# Patient Record
Sex: FEMALE | Race: WHITE | NOT HISPANIC OR LATINO
[De-identification: names, ages, dates, MRNs, and addresses within clinical notes are randomized per-mention and may not be internally consistent; named-entity substitution may affect disease eponyms.]

---

## 2017-04-28 ENCOUNTER — APPOINTMENT (OUTPATIENT)
Dept: ORTHOPEDIC SURGERY | Facility: CLINIC | Age: 67
End: 2017-04-28

## 2017-04-28 VITALS
SYSTOLIC BLOOD PRESSURE: 114 MMHG | WEIGHT: 185 LBS | BODY MASS INDEX: 30.82 KG/M2 | DIASTOLIC BLOOD PRESSURE: 74 MMHG | HEIGHT: 65 IN | HEART RATE: 77 BPM

## 2017-04-28 DIAGNOSIS — Z78.9 OTHER SPECIFIED HEALTH STATUS: ICD-10-CM

## 2017-04-28 DIAGNOSIS — Z60.2 PROBLEMS RELATED TO LIVING ALONE: ICD-10-CM

## 2017-04-28 DIAGNOSIS — M25.552 PAIN IN LEFT HIP: ICD-10-CM

## 2017-04-28 DIAGNOSIS — Z96.653 PRESENCE OF ARTIFICIAL KNEE JOINT, BILATERAL: ICD-10-CM

## 2017-04-28 DIAGNOSIS — M16.11 UNILATERAL PRIMARY OSTEOARTHRITIS, RIGHT HIP: ICD-10-CM

## 2017-04-28 DIAGNOSIS — Z82.61 FAMILY HISTORY OF ARTHRITIS: ICD-10-CM

## 2017-04-28 DIAGNOSIS — Z87.891 PERSONAL HISTORY OF NICOTINE DEPENDENCE: ICD-10-CM

## 2017-04-28 DIAGNOSIS — M19.011 PRIMARY OSTEOARTHRITIS, RIGHT SHOULDER: ICD-10-CM

## 2017-04-28 DIAGNOSIS — Z87.39 PERSONAL HISTORY OF OTHER DISEASES OF THE MUSCULOSKELETAL SYSTEM AND CONNECTIVE TISSUE: ICD-10-CM

## 2017-04-28 DIAGNOSIS — M25.562 PAIN IN LEFT KNEE: ICD-10-CM

## 2017-04-28 SDOH — SOCIAL STABILITY - SOCIAL INSECURITY: PROBLEMS RELATED TO LIVING ALONE: Z60.2

## 2017-05-26 ENCOUNTER — APPOINTMENT (OUTPATIENT)
Dept: ORTHOPEDIC SURGERY | Facility: CLINIC | Age: 67
End: 2017-05-26

## 2017-05-26 DIAGNOSIS — M16.12 UNILATERAL PRIMARY OSTEOARTHRITIS, LEFT HIP: ICD-10-CM

## 2017-05-26 DIAGNOSIS — Z96.652 PRESENCE OF LEFT ARTIFICIAL KNEE JOINT: ICD-10-CM

## 2017-05-26 DIAGNOSIS — Z96.651 PRESENCE OF RIGHT ARTIFICIAL KNEE JOINT: ICD-10-CM

## 2017-07-07 ENCOUNTER — APPOINTMENT (OUTPATIENT)
Dept: ORTHOPEDIC SURGERY | Facility: HOSPITAL | Age: 67
End: 2017-07-07

## 2017-07-25 ENCOUNTER — APPOINTMENT (OUTPATIENT)
Dept: ORTHOPEDIC SURGERY | Facility: CLINIC | Age: 67
End: 2017-07-25

## 2018-07-25 PROBLEM — M16.12 PRIMARY LOCALIZED OSTEOARTHROSIS OF PELVIC REGION, LEFT: Status: ACTIVE | Noted: 2017-04-28

## 2018-07-25 PROBLEM — M16.11 PRIMARY LOCALIZED OSTEOARTHROSIS OF PELVIC REGION, RIGHT: Status: ACTIVE | Noted: 2017-04-28

## 2019-10-02 PROBLEM — Z60.2 PERSON LIVING ALONE: Status: ACTIVE | Noted: 2017-04-28

## 2023-06-21 ENCOUNTER — OUTPATIENT (OUTPATIENT)
Dept: OUTPATIENT SERVICES | Facility: HOSPITAL | Age: 73
LOS: 1 days | End: 2023-06-21
Payer: MEDICARE

## 2023-06-21 ENCOUNTER — APPOINTMENT (OUTPATIENT)
Dept: CT IMAGING | Facility: CLINIC | Age: 73
End: 2023-06-21
Payer: MEDICARE

## 2023-06-21 DIAGNOSIS — C18.9 MALIGNANT NEOPLASM OF COLON, UNSPECIFIED: ICD-10-CM

## 2023-06-21 PROCEDURE — 74177 CT ABD & PELVIS W/CONTRAST: CPT | Mod: 26,MH

## 2023-06-21 PROCEDURE — 74177 CT ABD & PELVIS W/CONTRAST: CPT

## 2023-06-22 ENCOUNTER — APPOINTMENT (OUTPATIENT)
Dept: SURGERY | Facility: CLINIC | Age: 73
End: 2023-06-22
Payer: MEDICARE

## 2023-06-22 VITALS
RESPIRATION RATE: 16 BRPM | SYSTOLIC BLOOD PRESSURE: 127 MMHG | BODY MASS INDEX: 30.86 KG/M2 | DIASTOLIC BLOOD PRESSURE: 80 MMHG | HEART RATE: 83 BPM | HEIGHT: 64.5 IN | OXYGEN SATURATION: 100 % | TEMPERATURE: 96.3 F | WEIGHT: 183 LBS

## 2023-06-22 PROCEDURE — 99204 OFFICE O/P NEW MOD 45 MIN: CPT

## 2023-06-22 NOTE — CONSULT LETTER
[Dear  ___] : Dear ~MIKI, [Courtesy Letter:] : I had the pleasure of seeing your patient, [unfilled], in my office today. [Please see my note below.] : Please see my note below. [Consult Closing:] : Thank you very much for allowing me to participate in the care of this patient.  If you have any questions, please do not hesitate to contact me. [Sincerely,] : Sincerely, [FreeTextEntry2] : Dr. Marcelo Gonzales [FreeTextEntry3] : Phoenix Morales M.D., ROZ.MERISSA., F.[email or web address].[email or web address].\[facility] Chief Colorectal Clinical Services, [facility] [DrRemedios  ___] : Dr. WILLS

## 2023-06-22 NOTE — ASSESSMENT
[FreeTextEntry1] : I have seen and evaluated patient and I have corroborated all nursing input into this note.  Patient with what appears to be an ascending colon cancer.  Pathology is pending.  Results of the CT scans for the extent of disease work-up are also pending.  I reviewed indications, risks, benefits, alternatives for laparoscopic possible open right colectomy including but not limited to bleeding, infection, anastomotic leak, and change in bowel habits.  All questions were answered.  The patient would like to make arrangements to schedule the procedure while the results of her work-up are pending.

## 2023-06-22 NOTE — HISTORY OF PRESENT ILLNESS
[FreeTextEntry1] : Demi is a 72 yr. old female is here for consultation\par \par Colonoscopy by Dr. Gonzales - 06/20/23 - (Rectal bleeding and anemia) - Proximal AC colon mass c/w carcinoma \par 3 cm sigmoid polyp clipped and snared.\par 2.5 cm cecal polyp biopsied\par Several other small polyps noted\par \par Today feeling no pain.  Thought had diarrhea with blood along with BMs Tuesday, June 13th in school, when she got home had another episode, diaphoretic, nauseous, chills.  The patient has been told of the result.  No recent unintentional weight loss.  Formed/soft BMs once daily.   No abdominal surgeries.   Good appetite.  Denies prolapsing tissue or swelling.   No episodes of incontinence of stool or flatus.   Patient is on aspirin occasionally h/o blood clots over 50 years ago.   No family history of colon cancer, or diverticulitis.\par \par \par

## 2023-06-22 NOTE — PHYSICAL EXAM
[Normal Breath Sounds] : Normal breath sounds [Normal Heart Sounds] : normal heart sounds [No Rash or Lesion] : No rash or lesion [Alert] : alert [Oriented to Person] : oriented to person [Oriented to Place] : oriented to place [Oriented to Time] : oriented to time [Calm] : calm [Abdomen Masses] : No abdominal masses [Abdomen Tenderness] : ~T No ~M abdominal tenderness [de-identified] : WNL [de-identified] : WNL [de-identified] : JUANL [de-identified] : WNL ROM [de-identified] : WNL

## 2023-06-23 ENCOUNTER — APPOINTMENT (OUTPATIENT)
Dept: CT IMAGING | Facility: CLINIC | Age: 73
End: 2023-06-23
Payer: MEDICARE

## 2023-06-23 ENCOUNTER — OUTPATIENT (OUTPATIENT)
Dept: OUTPATIENT SERVICES | Facility: HOSPITAL | Age: 73
LOS: 1 days | End: 2023-06-23
Payer: MEDICARE

## 2023-06-23 DIAGNOSIS — C18.9 MALIGNANT NEOPLASM OF COLON, UNSPECIFIED: ICD-10-CM

## 2023-06-23 PROCEDURE — 71260 CT THORAX DX C+: CPT

## 2023-06-23 PROCEDURE — 71260 CT THORAX DX C+: CPT | Mod: 26,MH

## 2023-07-03 ENCOUNTER — RESULT REVIEW (OUTPATIENT)
Age: 73
End: 2023-07-03

## 2023-07-06 DIAGNOSIS — N63.0 UNSPECIFIED LUMP IN UNSPECIFIED BREAST: ICD-10-CM

## 2023-07-17 ENCOUNTER — OUTPATIENT (OUTPATIENT)
Dept: OUTPATIENT SERVICES | Facility: HOSPITAL | Age: 73
LOS: 1 days | End: 2023-07-17
Payer: MEDICARE

## 2023-07-17 VITALS
SYSTOLIC BLOOD PRESSURE: 114 MMHG | OXYGEN SATURATION: 99 % | HEART RATE: 92 BPM | RESPIRATION RATE: 20 BRPM | DIASTOLIC BLOOD PRESSURE: 81 MMHG | WEIGHT: 173.94 LBS | HEIGHT: 64 IN | TEMPERATURE: 99 F

## 2023-07-17 DIAGNOSIS — Z96.643 PRESENCE OF ARTIFICIAL HIP JOINT, BILATERAL: Chronic | ICD-10-CM

## 2023-07-17 DIAGNOSIS — C18.9 MALIGNANT NEOPLASM OF COLON, UNSPECIFIED: ICD-10-CM

## 2023-07-17 DIAGNOSIS — Z01.818 ENCOUNTER FOR OTHER PREPROCEDURAL EXAMINATION: ICD-10-CM

## 2023-07-17 DIAGNOSIS — Z29.9 ENCOUNTER FOR PROPHYLACTIC MEASURES, UNSPECIFIED: ICD-10-CM

## 2023-07-17 DIAGNOSIS — Z96.653 PRESENCE OF ARTIFICIAL KNEE JOINT, BILATERAL: Chronic | ICD-10-CM

## 2023-07-17 LAB
ANION GAP SERPL CALC-SCNC: 12 MMOL/L — SIGNIFICANT CHANGE UP (ref 5–17)
BLD GP AB SCN SERPL QL: NEGATIVE — SIGNIFICANT CHANGE UP
BUN SERPL-MCNC: 20 MG/DL — SIGNIFICANT CHANGE UP (ref 7–23)
CALCIUM SERPL-MCNC: 9.2 MG/DL — SIGNIFICANT CHANGE UP (ref 8.4–10.5)
CEA SERPL-MCNC: 3.8 NG/ML — SIGNIFICANT CHANGE UP (ref 0–3.8)
CHLORIDE SERPL-SCNC: 106 MMOL/L — SIGNIFICANT CHANGE UP (ref 96–108)
CO2 SERPL-SCNC: 23 MMOL/L — SIGNIFICANT CHANGE UP (ref 22–31)
CREAT SERPL-MCNC: 0.56 MG/DL — SIGNIFICANT CHANGE UP (ref 0.5–1.3)
EGFR: 97 ML/MIN/1.73M2 — SIGNIFICANT CHANGE UP
GLUCOSE SERPL-MCNC: 98 MG/DL — SIGNIFICANT CHANGE UP (ref 70–99)
HCT VFR BLD CALC: 30.6 % — LOW (ref 34.5–45)
HGB BLD-MCNC: 9.3 G/DL — LOW (ref 11.5–15.5)
MCHC RBC-ENTMCNC: 25 PG — LOW (ref 27–34)
MCHC RBC-ENTMCNC: 30.4 GM/DL — LOW (ref 32–36)
MCV RBC AUTO: 82.3 FL — SIGNIFICANT CHANGE UP (ref 80–100)
NRBC # BLD: 0 /100 WBCS — SIGNIFICANT CHANGE UP (ref 0–0)
PLATELET # BLD AUTO: 361 K/UL — SIGNIFICANT CHANGE UP (ref 150–400)
POTASSIUM SERPL-MCNC: 4.2 MMOL/L — SIGNIFICANT CHANGE UP (ref 3.5–5.3)
POTASSIUM SERPL-SCNC: 4.2 MMOL/L — SIGNIFICANT CHANGE UP (ref 3.5–5.3)
RBC # BLD: 3.72 M/UL — LOW (ref 3.8–5.2)
RBC # FLD: 20.9 % — HIGH (ref 10.3–14.5)
RH IG SCN BLD-IMP: NEGATIVE — SIGNIFICANT CHANGE UP
SODIUM SERPL-SCNC: 141 MMOL/L — SIGNIFICANT CHANGE UP (ref 135–145)
WBC # BLD: 4.47 K/UL — SIGNIFICANT CHANGE UP (ref 3.8–10.5)
WBC # FLD AUTO: 4.47 K/UL — SIGNIFICANT CHANGE UP (ref 3.8–10.5)

## 2023-07-17 PROCEDURE — 86901 BLOOD TYPING SEROLOGIC RH(D): CPT

## 2023-07-17 PROCEDURE — G0463: CPT

## 2023-07-17 PROCEDURE — 85027 COMPLETE CBC AUTOMATED: CPT

## 2023-07-17 PROCEDURE — 80048 BASIC METABOLIC PNL TOTAL CA: CPT

## 2023-07-17 PROCEDURE — 82378 CARCINOEMBRYONIC ANTIGEN: CPT

## 2023-07-17 PROCEDURE — 86900 BLOOD TYPING SEROLOGIC ABO: CPT

## 2023-07-17 PROCEDURE — 83036 HEMOGLOBIN GLYCOSYLATED A1C: CPT

## 2023-07-17 PROCEDURE — 86850 RBC ANTIBODY SCREEN: CPT

## 2023-07-17 RX ORDER — CIPROFLOXACIN LACTATE 400MG/40ML
400 VIAL (ML) INTRAVENOUS ONCE
Refills: 0 | Status: DISCONTINUED | OUTPATIENT
Start: 2023-07-25 | End: 2023-07-25

## 2023-07-17 RX ORDER — GABAPENTIN 400 MG/1
600 CAPSULE ORAL ONCE
Refills: 0 | Status: COMPLETED | OUTPATIENT
Start: 2023-07-25 | End: 2023-07-25

## 2023-07-17 RX ORDER — LIDOCAINE HCL 20 MG/ML
0.2 VIAL (ML) INJECTION ONCE
Refills: 0 | Status: DISCONTINUED | OUTPATIENT
Start: 2023-07-25 | End: 2023-07-25

## 2023-07-17 RX ORDER — CELECOXIB 200 MG/1
400 CAPSULE ORAL ONCE
Refills: 0 | Status: COMPLETED | OUTPATIENT
Start: 2023-07-25 | End: 2023-07-25

## 2023-07-17 RX ORDER — CHLORHEXIDINE GLUCONATE 213 G/1000ML
1 SOLUTION TOPICAL ONCE
Refills: 0 | Status: DISCONTINUED | OUTPATIENT
Start: 2023-07-25 | End: 2023-07-25

## 2023-07-17 RX ORDER — SODIUM CHLORIDE 9 MG/ML
3 INJECTION INTRAMUSCULAR; INTRAVENOUS; SUBCUTANEOUS EVERY 8 HOURS
Refills: 0 | Status: DISCONTINUED | OUTPATIENT
Start: 2023-07-25 | End: 2023-07-25

## 2023-07-17 NOTE — H&P PST ADULT - NSANTHOSAYNRD_GEN_A_CORE
No. ITZ screening performed.  STOP BANG Legend: 0-2 = LOW Risk; 3-4 = INTERMEDIATE Risk; 5-8 = HIGH Risk

## 2023-07-17 NOTE — H&P PST ADULT - ASSESSMENT
DASI score: 6  DASI activity: mows lawn, gardens, food shopping, housework, no formal exercise  Loose teeth or denture:  denies loose teeth  CAPRINI VTE 2.0 SCORE [CLOT updated 2019]    AGE RELATED RISK FACTORS                                                       MOBILITY RELATED FACTORS  [ ] Age 41-60 years                                            (1 Point)                    [ ] Bed rest                                                        (1 Point)  [ x] Age: 61-74 years                                           (2 Points)                  [ ] Plaster cast                                                   (2 Points)  [ ] Age= 75 years                                              (3 Points)                    [ ] Bed bound for more than 72 hours                 (2 Points)    DISEASE RELATED RISK FACTORS                                               GENDER SPECIFIC FACTORS  [ ] Edema in the lower extremities                       (1 Point)              [ ] Pregnancy                                                     (1 Point)  [ ] Varicose veins                                               (1 Point)                     [ ] Post-partum < 6 weeks                                   (1 Point)             [x ] BMI > 25 Kg/m2                                            (1 Point)                     [ ] Hormonal therapy  or oral contraception          (1 Point)                 [ ] Sepsis (in the previous month)                        (1 Point)               [ ] History of pregnancy complications                 (1 point)  [ ] Pneumonia or serious lung disease                                               [ ] Unexplained or recurrent                     (1 Point)           (in the previous month)                               (1 Point)  [ ] Abnormal pulmonary function test                     (1 Point)                 SURGERY RELATED RISK FACTORS  [ ] Acute myocardial infarction                              (1 Point)               [ ]  Section                                             (1 Point)  [ ] Congestive heart failure (in the previous month)  (1 Point)      [ ] Minor surgery                                                  (1 Point)   [ ] Inflammatory bowel disease                             (1 Point)               [ ] Arthroscopic surgery                                        (2 Points)  [ ] Central venous access                                      (2 Points)                [ x] General surgery lasting more than 45 minutes (2 points)  [x ] Malignancy- Present or previous                   (2 Points)                [ ] Elective arthroplasty                                         (5 points)    [ ] Stroke (in the previous month)                          (5 Points)                                                                                                                                                           HEMATOLOGY RELATED FACTORS                                                 TRAUMA RELATED RISK FACTORS  [x ] Prior episodes of VTE                                     (3 Points)                [ ] Fracture of the hip, pelvis, or leg                       (5 Points)  [ ] Positive family history for VTE                         (3 Points)             [ ] Acute spinal cord injury (in the previous month)  (5 Points)  [ ] Prothrombin 88570 A                                     (3 Points)               [ ] Paralysis  (less than 1 month)                             (5 Points)  [ ] Factor V Leiden                                             (3 Points)                  [ ] Multiple Trauma within 1 month                        (5 Points)  [ ] Lupus anticoagulants                                     (3 Points)                                                           [ ] Anticardiolipin antibodies                               (3 Points)                                                       [ ] High homocysteine in the blood                      (3 Points)                                             [ ] Other congenital or acquired thrombophilia      (3 Points)                                                [ ] Heparin induced thrombocytopenia                  (3 Points)                                     Total Score [     10     ]

## 2023-07-17 NOTE — H&P PST ADULT - HISTORY OF PRESENT ILLNESS
73 YO FEMALE with episode on 6/13/23 with severe bloody diarrhea and stomach grumbling x2 over two hour span.  colonoscopy revealed colon mass, now for colon resection 7/25/23.   PMHX of PE age 29, no recurrence, Osteoarthritis (GELY hips and Knees done),sensitive skin. 73 YO FEMALE with episode on 6/13/23 with severe bloody diarrhea and stomach grumbling x2 over two hour span.  colonoscopy revealed colon mass, now for colon resection 7/25/23.   PMHX of PE age 29, no recurrence, Osteoarthritis (GELY hips and Knees done),sensitive skin.  Pt states low blood count 6/21/23 was told she may need preop blood transfusion, CBC rechecked today at PST.

## 2023-07-17 NOTE — H&P PST ADULT - NSICDXPASTSURGICALHX_GEN_ALL_CORE_FT
PAST SURGICAL HISTORY:  S/P hip replacement, bilateral     S/p total knee replacement, bilateral

## 2023-07-18 LAB
A1C WITH ESTIMATED AVERAGE GLUCOSE RESULT: 5.3 % — SIGNIFICANT CHANGE UP (ref 4–5.6)
ESTIMATED AVERAGE GLUCOSE: 105 MG/DL — SIGNIFICANT CHANGE UP (ref 68–114)

## 2023-07-24 ENCOUNTER — TRANSCRIPTION ENCOUNTER (OUTPATIENT)
Age: 73
End: 2023-07-24

## 2023-07-25 ENCOUNTER — APPOINTMENT (OUTPATIENT)
Dept: SURGERY | Facility: HOSPITAL | Age: 73
End: 2023-07-25
Payer: MEDICARE

## 2023-07-25 ENCOUNTER — INPATIENT (INPATIENT)
Facility: HOSPITAL | Age: 73
LOS: 5 days | Discharge: HOME | DRG: 330 | End: 2023-07-31
Payer: MEDICARE

## 2023-07-25 ENCOUNTER — RESULT REVIEW (OUTPATIENT)
Age: 73
End: 2023-07-25

## 2023-07-25 VITALS
HEART RATE: 96 BPM | WEIGHT: 173.94 LBS | RESPIRATION RATE: 18 BRPM | TEMPERATURE: 98 F | SYSTOLIC BLOOD PRESSURE: 105 MMHG | OXYGEN SATURATION: 97 % | HEIGHT: 64 IN | DIASTOLIC BLOOD PRESSURE: 72 MMHG

## 2023-07-25 DIAGNOSIS — Z96.653 PRESENCE OF ARTIFICIAL KNEE JOINT, BILATERAL: Chronic | ICD-10-CM

## 2023-07-25 DIAGNOSIS — Z96.643 PRESENCE OF ARTIFICIAL HIP JOINT, BILATERAL: Chronic | ICD-10-CM

## 2023-07-25 DIAGNOSIS — C18.9 MALIGNANT NEOPLASM OF COLON, UNSPECIFIED: ICD-10-CM

## 2023-07-25 LAB — GLUCOSE BLDC GLUCOMTR-MCNC: 234 MG/DL — HIGH (ref 70–99)

## 2023-07-25 PROCEDURE — 88302 TISSUE EXAM BY PATHOLOGIST: CPT | Mod: 26

## 2023-07-25 PROCEDURE — 88342 IMHCHEM/IMCYTCHM 1ST ANTB: CPT | Mod: 26

## 2023-07-25 PROCEDURE — 49592 RPR AA HRN 1ST < 3 NCR/STRN: CPT

## 2023-07-25 PROCEDURE — 88309 TISSUE EXAM BY PATHOLOGIST: CPT | Mod: 26

## 2023-07-25 PROCEDURE — 44204 LAPARO PARTIAL COLECTOMY: CPT

## 2023-07-25 PROCEDURE — 88341 IMHCHEM/IMCYTCHM EA ADD ANTB: CPT | Mod: 26

## 2023-07-25 DEVICE — STAPLER COVIDIEN GIA 80-4.0MM BLACK: Type: IMPLANTABLE DEVICE | Status: FUNCTIONAL

## 2023-07-25 DEVICE — STAPLER COVIDIEN GIA 80-3.0MM PURPLE RELOAD: Type: IMPLANTABLE DEVICE | Status: FUNCTIONAL

## 2023-07-25 RX ORDER — SODIUM CHLORIDE 9 MG/ML
1000 INJECTION, SOLUTION INTRAVENOUS
Refills: 0 | Status: DISCONTINUED | OUTPATIENT
Start: 2023-07-25 | End: 2023-07-26

## 2023-07-25 RX ORDER — HYDROMORPHONE HYDROCHLORIDE 2 MG/ML
0.25 INJECTION INTRAMUSCULAR; INTRAVENOUS; SUBCUTANEOUS
Refills: 0 | Status: DISCONTINUED | OUTPATIENT
Start: 2023-07-25 | End: 2023-07-25

## 2023-07-25 RX ORDER — ACETAMINOPHEN 500 MG
1000 TABLET ORAL EVERY 6 HOURS
Refills: 0 | Status: COMPLETED | OUTPATIENT
Start: 2023-07-25 | End: 2023-07-26

## 2023-07-25 RX ORDER — OXYCODONE HYDROCHLORIDE 5 MG/1
5 TABLET ORAL EVERY 6 HOURS
Refills: 0 | Status: DISCONTINUED | OUTPATIENT
Start: 2023-07-25 | End: 2023-07-27

## 2023-07-25 RX ORDER — SODIUM CHLORIDE 9 MG/ML
1000 INJECTION, SOLUTION INTRAVENOUS
Refills: 0 | Status: DISCONTINUED | OUTPATIENT
Start: 2023-07-25 | End: 2023-07-25

## 2023-07-25 RX ORDER — OXYCODONE HYDROCHLORIDE 5 MG/1
2.5 TABLET ORAL EVERY 6 HOURS
Refills: 0 | Status: DISCONTINUED | OUTPATIENT
Start: 2023-07-25 | End: 2023-07-27

## 2023-07-25 RX ADMIN — CELECOXIB 400 MILLIGRAM(S): 200 CAPSULE ORAL at 17:20

## 2023-07-25 RX ADMIN — GABAPENTIN 600 MILLIGRAM(S): 400 CAPSULE ORAL at 17:21

## 2023-07-25 RX ADMIN — SODIUM CHLORIDE 40 MILLILITER(S): 9 INJECTION, SOLUTION INTRAVENOUS at 23:35

## 2023-07-25 NOTE — BRIEF OPERATIVE NOTE - OPERATION/FINDINGS
Hand assisted laparoscopic right hemicolectomy with extracorporeal anastamosis, stapled. Ureter identified and protected. Pre op Duramorph. Intra op TAP block.

## 2023-07-26 ENCOUNTER — TRANSCRIPTION ENCOUNTER (OUTPATIENT)
Age: 73
End: 2023-07-26

## 2023-07-26 LAB
ANION GAP SERPL CALC-SCNC: 11 MMOL/L — SIGNIFICANT CHANGE UP (ref 5–17)
BUN SERPL-MCNC: 10 MG/DL — SIGNIFICANT CHANGE UP (ref 7–23)
CALCIUM SERPL-MCNC: 8.6 MG/DL — SIGNIFICANT CHANGE UP (ref 8.4–10.5)
CHLORIDE SERPL-SCNC: 105 MMOL/L — SIGNIFICANT CHANGE UP (ref 96–108)
CO2 SERPL-SCNC: 23 MMOL/L — SIGNIFICANT CHANGE UP (ref 22–31)
CREAT SERPL-MCNC: 0.57 MG/DL — SIGNIFICANT CHANGE UP (ref 0.5–1.3)
EGFR: 96 ML/MIN/1.73M2 — SIGNIFICANT CHANGE UP
GLUCOSE SERPL-MCNC: 171 MG/DL — HIGH (ref 70–99)
HCT VFR BLD CALC: 32.8 % — LOW (ref 34.5–45)
HGB BLD-MCNC: 9.7 G/DL — LOW (ref 11.5–15.5)
MAGNESIUM SERPL-MCNC: 2.3 MG/DL — SIGNIFICANT CHANGE UP (ref 1.6–2.6)
MCHC RBC-ENTMCNC: 24.6 PG — LOW (ref 27–34)
MCHC RBC-ENTMCNC: 29.6 GM/DL — LOW (ref 32–36)
MCV RBC AUTO: 83 FL — SIGNIFICANT CHANGE UP (ref 80–100)
NRBC # BLD: 0 /100 WBCS — SIGNIFICANT CHANGE UP (ref 0–0)
PHOSPHATE SERPL-MCNC: 4.1 MG/DL — SIGNIFICANT CHANGE UP (ref 2.5–4.5)
PLATELET # BLD AUTO: 269 K/UL — SIGNIFICANT CHANGE UP (ref 150–400)
POTASSIUM SERPL-MCNC: 4.2 MMOL/L — SIGNIFICANT CHANGE UP (ref 3.5–5.3)
POTASSIUM SERPL-SCNC: 4.2 MMOL/L — SIGNIFICANT CHANGE UP (ref 3.5–5.3)
RBC # BLD: 3.95 M/UL — SIGNIFICANT CHANGE UP (ref 3.8–5.2)
RBC # FLD: 20 % — HIGH (ref 10.3–14.5)
SODIUM SERPL-SCNC: 139 MMOL/L — SIGNIFICANT CHANGE UP (ref 135–145)
WBC # BLD: 7.53 K/UL — SIGNIFICANT CHANGE UP (ref 3.8–10.5)
WBC # FLD AUTO: 7.53 K/UL — SIGNIFICANT CHANGE UP (ref 3.8–10.5)

## 2023-07-26 RX ORDER — NALOXONE HYDROCHLORIDE 4 MG/.1ML
0.1 SPRAY NASAL
Refills: 0 | Status: DISCONTINUED | OUTPATIENT
Start: 2023-07-26 | End: 2023-07-30

## 2023-07-26 RX ORDER — MORPHINE SULFATE 50 MG/1
0.1 CAPSULE, EXTENDED RELEASE ORAL ONCE
Refills: 0 | Status: DISCONTINUED | OUTPATIENT
Start: 2023-07-26 | End: 2023-07-26

## 2023-07-26 RX ORDER — NALBUPHINE HYDROCHLORIDE 10 MG/ML
2.5 INJECTION, SOLUTION INTRAMUSCULAR; INTRAVENOUS; SUBCUTANEOUS EVERY 6 HOURS
Refills: 0 | Status: DISCONTINUED | OUTPATIENT
Start: 2023-07-26 | End: 2023-07-30

## 2023-07-26 RX ORDER — MAGNESIUM OXIDE 400 MG ORAL TABLET 241.3 MG
1000 TABLET ORAL
Refills: 0 | Status: DISCONTINUED | OUTPATIENT
Start: 2023-07-26 | End: 2023-07-27

## 2023-07-26 RX ORDER — ONDANSETRON 8 MG/1
4 TABLET, FILM COATED ORAL EVERY 6 HOURS
Refills: 0 | Status: DISCONTINUED | OUTPATIENT
Start: 2023-07-26 | End: 2023-07-30

## 2023-07-26 RX ORDER — HEPARIN SODIUM 5000 [USP'U]/ML
5000 INJECTION INTRAVENOUS; SUBCUTANEOUS EVERY 8 HOURS
Refills: 0 | Status: DISCONTINUED | OUTPATIENT
Start: 2023-07-26 | End: 2023-07-31

## 2023-07-26 RX ORDER — ACETAMINOPHEN 500 MG
1000 TABLET ORAL EVERY 6 HOURS
Refills: 0 | Status: DISCONTINUED | OUTPATIENT
Start: 2023-07-27 | End: 2023-07-27

## 2023-07-26 RX ADMIN — HEPARIN SODIUM 5000 UNIT(S): 5000 INJECTION INTRAVENOUS; SUBCUTANEOUS at 22:36

## 2023-07-26 RX ADMIN — Medication 400 MILLIGRAM(S): at 16:30

## 2023-07-26 RX ADMIN — SODIUM CHLORIDE 40 MILLILITER(S): 9 INJECTION, SOLUTION INTRAVENOUS at 05:46

## 2023-07-26 RX ADMIN — Medication 400 MILLIGRAM(S): at 10:55

## 2023-07-26 RX ADMIN — Medication 1000 MILLIGRAM(S): at 06:15

## 2023-07-26 RX ADMIN — Medication 1000 MILLIGRAM(S): at 23:05

## 2023-07-26 RX ADMIN — Medication 400 MILLIGRAM(S): at 22:35

## 2023-07-26 RX ADMIN — MAGNESIUM OXIDE 400 MG ORAL TABLET 1000 MILLIGRAM(S): 241.3 TABLET ORAL at 12:28

## 2023-07-26 RX ADMIN — HEPARIN SODIUM 5000 UNIT(S): 5000 INJECTION INTRAVENOUS; SUBCUTANEOUS at 12:30

## 2023-07-26 RX ADMIN — Medication 1000 MILLIGRAM(S): at 17:00

## 2023-07-26 RX ADMIN — Medication 400 MILLIGRAM(S): at 05:45

## 2023-07-26 NOTE — DISCHARGE NOTE PROVIDER - HOSPITAL COURSE
HPI:  71 YO FEMALE with episode on 6/13/23 with severe bloody diarrhea and stomach grumbling x2 over two hour span.  colonoscopy revealed colon mass, now for colon resection 7/25/23.   PMHX of PE age 29, no recurrence, Osteoarthritis (GELY hips and Knees done),sensitive skin.  Pt states low blood count 6/21/23 was told she may need preop blood transfusion, CBC rechecked today at [facility]. (17 Jul 2023 15:54)    Patient presented for scheduled procedure. Underwent lap R colectomy without complication and was transferred to the PACU. When PACU criteria was met, patient was transferred to the floor for observation and continuation of ERP protocol. Postoperatively, pain well controlled. Singh removed POD1 with subsequent successful trial of void. PT evaluated patient and recommended ___________. Dietician consulted for education. Diet advanced as tolerated to low fiber diet. DVT ppx with SCDs and SQH administered throughout hospital stay. Ambulation and incentive spirometry encouraged. Home meds resumed. Labs trended and lytes repleted prn. Local wound care to incision sites daily. At time of discharge, patient was hemodynamically stable, tolerating LFD and pain was adequately controlled.    HPI:  73 YO FEMALE with episode on 6/13/23 with severe bloody diarrhea and stomach grumbling x2 over two hour span.  colonoscopy revealed colon mass, now for colon resection 7/25/23.   PMHX of PE age 29, no recurrence, Osteoarthritis (GELY hips and Knees done),sensitive skin.  Pt states low blood count 6/21/23 was told she may need preop blood transfusion, CBC rechecked today at [facility]. (17 Jul 2023 15:54)    Patient presented for scheduled procedure. Underwent lap R colectomy without complication and was transferred to the PACU. When PACU criteria was met, patient was transferred to the floor for observation and continuation of ERP protocol. Postoperatively, pain well controlled. Singh removed POD1 with subsequent successful trial of void. PT evaluated patient and recommended home with no skilled PT needs. Dietician consulted for education. Diet advanced as tolerated to low fiber diet. On 7/27 the patient had nausea and a nasogastric tube was placed for ileus. Patient had return of bowel function and nasogastric tube was removed. Diet was advanced as tolerated. DVT ppx with SCDs and SQH administered throughout hospital stay. Ambulation and incentive spirometry encouraged. Home meds resumed. Labs trended and lytes repleted prn. Local wound care to incision sites daily. At time of discharge, patient was hemodynamically stable, tolerating LFD and pain was adequately controlled.     The patients caprini score was calculated to be 10 and the patient was sent DVT ppx.

## 2023-07-26 NOTE — DISCHARGE NOTE PROVIDER - NSDCCPCAREPLAN_GEN_ALL_CORE_FT
PRINCIPAL DISCHARGE DIAGNOSIS  Diagnosis: Malignant neoplasm of colon  Assessment and Plan of Treatment: WOUND CARE: Staples will be removed at follow up office visit. Remove dry dressings prior to showering. Replace dry gauze dressing as needed.   BATHING: Please do not submerge wound underwater. No swimming pools, no hot tubs, no tub baths. You may shower and/or sponge bathe in 24 hours. Let soapy water run over incisions. DO NOT scrub or soak incision sites. Pat dry.   ACTIVITY: No heavy lifting more than 10-15lbs or straining. Otherwise, you may return to your usual level of physical activity. You may complete your daily activities. Take frequent walks. If you are taking narcotic pain medication (such as Oxycodone), do NOT drive a car, operate machinery or make important decisions.  DIET: Low fiber diet as tolerated   NOTIFY YOUR SURGEON IF: You have any bleeding that does not stop, any pus draining from your wound, any fever (over 100.4 F) or chills, persistent nausea/vomiting with inability to tolerate food or liquids, persistent diarrhea, or if your pain is not controlled on your discharge pain medications.  FOLLOW-UP:  1. Please call to make a follow-up appointment with Dr. Morales within one week of discharge   2. Please follow up with your primary care physician in one week regarding your hospitalization.      SECONDARY DISCHARGE DIAGNOSES  Diagnosis: Need for prophylactic measure  Assessment and Plan of Treatment:      PRINCIPAL DISCHARGE DIAGNOSIS  Diagnosis: Malignant neoplasm of colon  Assessment and Plan of Treatment: WOUND CARE: Staples will be removed at follow up office visit. Remove dry dressings prior to showering. Replace dry gauze dressing as needed.   BATHING: Please do not submerge wound underwater. No swimming pools, no hot tubs, no tub baths. You may shower and/or sponge bathe in 24 hours. Let soapy water run over incisions. DO NOT scrub or soak incision sites. Pat dry.   ACTIVITY: No heavy lifting more than 10-15lbs or straining. Otherwise, you may return to your usual level of physical activity. You may complete your daily activities. Take frequent walks. If you are taking narcotic pain medication (such as Oxycodone), do NOT drive a car, operate machinery or make important decisions.  DIET: Low fiber diet as tolerated   NOTIFY YOUR SURGEON IF: You have any bleeding that does not stop, any pus draining from your wound, any fever (over 100.4 F) or chills, persistent nausea/vomiting with inability to tolerate food or liquids, persistent diarrhea, or if your pain is not controlled on your discharge pain medications.  FOLLOW-UP:  1. Please call to make a follow-up appointment with Dr. Morales within one week of discharge   2. Please follow up with your primary care physician in one week regarding your hospitalization.      SECONDARY DISCHARGE DIAGNOSES  Diagnosis: Need for prophylactic measure  Assessment and Plan of Treatment: Based on your calculated Caprini Score, it was determined  you are at a higher risk to developing a deep vein thrombosis (DVT) or a pulmonary embolus (PE) post operatively.  You are encouraged to ambulate regulary at home and start the anticoagulation medication Eliquis.   Please take as prescribed. The prescription has been sent to your pharmacy.   Please call your Primary Care physician or Surgeon and return to ER  if you have any swelling and/or pain of your extremities, feel short of breath or having any difficulty breathing.

## 2023-07-26 NOTE — PHYSICAL THERAPY INITIAL EVALUATION ADULT - PERTINENT HX OF CURRENT PROBLEM, REHAB EVAL
71 YO FEMALE with episode on 6/13/23 with severe bloody diarrhea and stomach grumbling x2 over two hour span.  colonoscopy revealed colon mass, now for colon resection 7/25/23.   PMHX of PE age 29, no recurrence, Osteoarthritis (GELY hips and Knees done),sensitive skin. image pending

## 2023-07-26 NOTE — PROGRESS NOTE ADULT - SUBJECTIVE AND OBJECTIVE BOX
[facility] Daily Resident Progress Note    OVERNIGHT: Flatus/BM, UOP, NAEON.     SUBJECTIVE: Pt seen and examined at bedside. Pain well-controlled, denies nausea/vomiting.     OBJECTIVE:  Vital Signs Last 24 Hrs  T(C): 36.4 (26 Jul 2023 04:33), Max: 36.5 (25 Jul 2023 12:47)  T(F): 97.5 (26 Jul 2023 04:33), Max: 97.7 (25 Jul 2023 12:47)  HR: 78 (26 Jul 2023 04:33) (78 - 96)  BP: 94/58 (26 Jul 2023 04:33) (94/58 - 106/54)  BP(mean): 71 (26 Jul 2023 04:33) (70 - 78)  RR: 17 (26 Jul 2023 04:33) (15 - 18)  SpO2: 97% (26 Jul 2023 04:33) (95% - 99%)    Parameters below as of 26 Jul 2023 04:33  Patient On (Oxygen Delivery Method): nasal cannula  O2 Flow (L/min): 2    Physical Exam:  General Appearance: Comfortable appearing, alert, no gross cognitive alteration.   Chest: Equal expansion bilaterally, no increased WOB.   CV: WWP.    Abdomen: Soft, non-tender, appropriately distended.   Extremities: No swelling, asymmetry, or gross deformity appreciated.     I&O's Summary    25 Jul 2023 07:01  -  26 Jul 2023 05:15  --------------------------------------------------------  IN: 400 mL / OUT: 430 mL / NET: -30 mL      lactated ringers. milliLiter(s) (40 mL/Hr)      Medications:  MEDICATIONS  (STANDING):  acetaminophen   IVPB .. 1000 milliGRAM(s) IV Intermittent every 6 hours  lactated ringers. 1000 milliLiter(s) (40 mL/Hr) IV Continuous <Continuous>  morphine PF Spinal 0.1 milliGRAM(s) IntraThecal. once    MEDICATIONS  (PRN):  nalbuphine Injectable 2.5 milliGRAM(s) IV Push every 6 hours PRN Pruritus  naloxone Injectable 0.1 milliGRAM(s) IV Push every 3 minutes PRN For ANY of the following changes in patient status:  A. RR LESS THAN 10 breaths per minute, B. Oxygen saturation LESS THAN 90%, C. Sedation score of 6  ondansetron Injectable 4 milliGRAM(s) IV Push every 6 hours PRN Nausea  oxyCODONE    IR 2.5 milliGRAM(s) Oral every 6 hours PRN Moderate Pain (4 - 6)  oxyCODONE    IR 5 milliGRAM(s) Oral every 6 hours PRN Severe Pain (7 - 10)    Allergies:  penicillin (Unknown)      Labs:           [place] [facility] Daily Resident Progress Note    OVERNIGHT:     SUBJECTIVE: Pt seen and examined at bedside.     OBJECTIVE:  Vital Signs Last 24 Hrs  T(C): 36.4 (26 Jul 2023 04:33), Max: 36.5 (25 Jul 2023 12:47)  T(F): 97.5 (26 Jul 2023 04:33), Max: 97.7 (25 Jul 2023 12:47)  HR: 78 (26 Jul 2023 04:33) (78 - 96)  BP: 94/58 (26 Jul 2023 04:33) (94/58 - 106/54)  BP(mean): 71 (26 Jul 2023 04:33) (70 - 78)  RR: 17 (26 Jul 2023 04:33) (15 - 18)  SpO2: 97% (26 Jul 2023 04:33) (95% - 99%)    Parameters below as of 26 Jul 2023 04:33  Patient On (Oxygen Delivery Method): nasal cannula  O2 Flow (L/min): 2    Physical Exam:  General Appearance:   Chest:    CV:  Abdomen:    Extremities:     I&O's Summary    25 Jul 2023 07:01  -  26 Jul 2023 05:15  --------------------------------------------------------  IN: 400 mL / OUT: 430 mL / NET: -30 mL    Medications:  MEDICATIONS  (STANDING):  acetaminophen   IVPB .. 1000 milliGRAM(s) IV Intermittent every 6 hours  lactated ringers. 1000 milliLiter(s) (40 mL/Hr) IV Continuous <Continuous>  morphine PF Spinal 0.1 milliGRAM(s) IntraThecal. once    MEDICATIONS  (PRN):  nalbuphine Injectable 2.5 milliGRAM(s) IV Push every 6 hours PRN Pruritus  naloxone Injectable 0.1 milliGRAM(s) IV Push every 3 minutes PRN For ANY of the following changes in patient status:  A. RR LESS THAN 10 breaths per minute, B. Oxygen saturation LESS THAN 90%, C. Sedation score of 6  ondansetron Injectable 4 milliGRAM(s) IV Push every 6 hours PRN Nausea  oxyCODONE    IR 2.5 milliGRAM(s) Oral every 6 hours PRN Moderate Pain (4 - 6)  oxyCODONE    IR 5 milliGRAM(s) Oral every 6 hours PRN Severe Pain (7 - 10)    Allergies:  penicillin (Unknown)       [place] [facility] Daily Resident Progress Note    OVERNIGHT: Recovering appropriately on POC @ 0315. No CP, SOB, clinically significant hypotension ON.     SUBJECTIVE: Pt seen and examined at bedside.     OBJECTIVE:  Vital Signs Last 24 Hrs  T(C): 36.4 (26 Jul 2023 04:33), Max: 36.5 (25 Jul 2023 12:47)  T(F): 97.5 (26 Jul 2023 04:33), Max: 97.7 (25 Jul 2023 12:47)  HR: 78 (26 Jul 2023 04:33) (78 - 96)  BP: 94/58 (26 Jul 2023 04:33) (94/58 - 106/54)  BP(mean): 71 (26 Jul 2023 04:33) (70 - 78)  RR: 17 (26 Jul 2023 04:33) (15 - 18)  SpO2: 97% (26 Jul 2023 04:33) (95% - 99%)    Parameters below as of 26 Jul 2023 04:33  Patient On (Oxygen Delivery Method): nasal cannula  O2 Flow (L/min): 2    Physical Exam:  General Appearance:   Chest:    CV:  Abdomen:    Extremities:     I&O's Summary    25 Jul 2023 07:01  -  26 Jul 2023 05:15  --------------------------------------------------------  IN: 400 mL / OUT: 430 mL / NET: -30 mL    Medications:  MEDICATIONS  (STANDING):  acetaminophen   IVPB .. 1000 milliGRAM(s) IV Intermittent every 6 hours  lactated ringers. 1000 milliLiter(s) (40 mL/Hr) IV Continuous <Continuous>  morphine PF Spinal 0.1 milliGRAM(s) IntraThecal. once    MEDICATIONS  (PRN):  nalbuphine Injectable 2.5 milliGRAM(s) IV Push every 6 hours PRN Pruritus  naloxone Injectable 0.1 milliGRAM(s) IV Push every 3 minutes PRN For ANY of the following changes in patient status:  A. RR LESS THAN 10 breaths per minute, B. Oxygen saturation LESS THAN 90%, C. Sedation score of 6  ondansetron Injectable 4 milliGRAM(s) IV Push every 6 hours PRN Nausea  oxyCODONE    IR 2.5 milliGRAM(s) Oral every 6 hours PRN Moderate Pain (4 - 6)  oxyCODONE    IR 5 milliGRAM(s) Oral every 6 hours PRN Severe Pain (7 - 10)    Allergies:  penicillin (Unknown)       [place] [facility] Daily Resident Progress Note    OVERNIGHT: Recovering appropriately on POC @ 0315. No CP, SOB, clinically significant hypotension ON.     SUBJECTIVE: Pt seen and examined at bedside. Comfortable, no acute distress, denies pain, nausea, vomiting.     OBJECTIVE:  Vital Signs Last 24 Hrs  T(C): 36.4 (26 Jul 2023 04:33), Max: 36.5 (25 Jul 2023 12:47)  T(F): 97.5 (26 Jul 2023 04:33), Max: 97.7 (25 Jul 2023 12:47)  HR: 78 (26 Jul 2023 04:33) (78 - 96)  BP: 94/58 (26 Jul 2023 04:33) (94/58 - 106/54)  BP(mean): 71 (26 Jul 2023 04:33) (70 - 78)  RR: 17 (26 Jul 2023 04:33) (15 - 18)  SpO2: 97% (26 Jul 2023 04:33) (95% - 99%)    Parameters below as of 26 Jul 2023 04:33  Patient On (Oxygen Delivery Method): nasal cannula  O2 Flow (L/min): 2    Physical Exam:  General Appearance: NAD, comfortable.   Chest: Equal expansion bilaterally, no inc WOB.   CV: WWP.  Abdomen: Soft NTND.   Extremities: No gross abnormality appreciated.     I&O's Summary    25 Jul 2023 07:01  -  26 Jul 2023 05:15  --------------------------------------------------------  IN: 400 mL / OUT: 430 mL / NET: -30 mL    Medications:  MEDICATIONS  (STANDING):  acetaminophen   IVPB .. 1000 milliGRAM(s) IV Intermittent every 6 hours  lactated ringers. 1000 milliLiter(s) (40 mL/Hr) IV Continuous <Continuous>  morphine PF Spinal 0.1 milliGRAM(s) IntraThecal. once    MEDICATIONS  (PRN):  nalbuphine Injectable 2.5 milliGRAM(s) IV Push every 6 hours PRN Pruritus  naloxone Injectable 0.1 milliGRAM(s) IV Push every 3 minutes PRN For ANY of the following changes in patient status:  A. RR LESS THAN 10 breaths per minute, B. Oxygen saturation LESS THAN 90%, C. Sedation score of 6  ondansetron Injectable 4 milliGRAM(s) IV Push every 6 hours PRN Nausea  oxyCODONE    IR 2.5 milliGRAM(s) Oral every 6 hours PRN Moderate Pain (4 - 6)  oxyCODONE    IR 5 milliGRAM(s) Oral every 6 hours PRN Severe Pain (7 - 10)    Allergies:  penicillin (Unknown)

## 2023-07-26 NOTE — DISCHARGE NOTE PROVIDER - CARE PROVIDERS DIRECT ADDRESSES
[email or web address]

## 2023-07-26 NOTE — DISCHARGE NOTE PROVIDER - NSDCFUSCHEDAPPT_GEN_ALL_CORE_FT
[facility] Physician [facility]  BRSTIMAG [address]  Scheduled Appointment: 09/01/2023    [facility]  ULTRASND  Giovani  Scheduled Appointment: 09/01/2023

## 2023-07-26 NOTE — DISCHARGE NOTE PROVIDER - NSDCMRMEDTOKEN_GEN_ALL_CORE_FT
acetaminophen 325 mg oral tablet: 2 tab(s) orally every 6 hours As needed Mild Pain (1 - 3), Moderate Pain (4 - 6)  Eliquis 2.5 mg oral tablet: 1 tab(s) orally 2 times a day

## 2023-07-26 NOTE — PROGRESS NOTE ADULT - SUBJECTIVE AND OBJECTIVE BOX
Day 1 of Anesthesia Pain Management Service    SUBJECTIVE: Doing ok  Pain Scale Score:          [X] Refer to charted pain scores    THERAPY:    s/p    100 mcg PF morphine on 7\25\2023       MEDICATIONS  (STANDING):  acetaminophen   IVPB .. 1000 milliGRAM(s) IV Intermittent every 6 hours  magnesium oxide 1000 milliGRAM(s) Oral two times a day with meals  morphine PF Spinal 0.1 milliGRAM(s) IntraThecal. once    MEDICATIONS  (PRN):  nalbuphine Injectable 2.5 milliGRAM(s) IV Push every 6 hours PRN Pruritus  naloxone Injectable 0.1 milliGRAM(s) IV Push every 3 minutes PRN For ANY of the following changes in patient status:  A. RR LESS THAN 10 breaths per minute, B. Oxygen saturation LESS THAN 90%, C. Sedation score of 6  ondansetron Injectable 4 milliGRAM(s) IV Push every 6 hours PRN Nausea  oxyCODONE    IR 2.5 milliGRAM(s) Oral every 6 hours PRN Moderate Pain (4 - 6)  oxyCODONE    IR 5 milliGRAM(s) Oral every 6 hours PRN Severe Pain (7 - 10)      OBJECTIVE:    Sedation:        	[X] Alert	 [ ] Drowsy	[ ] Arousable      [ ] Asleep       [ ] Unresponsive    Side Effects:	[X] None 	[ ] Nausea	[ ] Vomiting         [ ] Pruritus  		[ ] Weakness            [ ] Numbness	          [ ] Other:    Vital Signs Last 24 Hrs  T(C): 36.2 (26 Jul 2023 07:00), Max: 36.5 (25 Jul 2023 12:47)  T(F): 97.2 (26 Jul 2023 07:00), Max: 97.7 (25 Jul 2023 12:47)  HR: 86 (26 Jul 2023 09:00) (66 - 96)  BP: 90/50 (26 Jul 2023 09:00) (90/50 - 106/54)  BP(mean): 62 (26 Jul 2023 09:00) (62 - 79)  RR: 16 (26 Jul 2023 09:00) (15 - 18)  SpO2: 96% (26 Jul 2023 09:00) (94% - 99%)    Parameters below as of 26 Jul 2023 09:00  Patient On (Oxygen Delivery Method): room air        ASSESSMENT/ PLAN  [X] Patient to be transitioned to prn analgesics after 24 hours  [X] Pain management per primary service, pain service to sign off   [X]Documentation and Verification of current medications

## 2023-07-26 NOTE — DISCHARGE NOTE PROVIDER - NSDCFUADDAPPT_GEN_ALL_CORE_FT
Please make an appointment and follow up with Dr. Ash De Oliveira regarding your long term management and hematology work up.

## 2023-07-26 NOTE — PROGRESS NOTE ADULT - PROVIDER SPECIALTY LIST ADULT
Colorectal Surgery Was The Patient On Physician Recommended Anticoagulation Therapy?: Please Select the Appropriate Response

## 2023-07-26 NOTE — DISCHARGE NOTE PROVIDER - CARE PROVIDER_API CALL
Phoenix Morales  Colon/Rectal Surgery  [address] [place] [address], [address]  [place] [address]  Phone: [phone]  Fax: [phone]  Follow Up Time: 2 weeks   Phoenix Morales  Colon/Rectal Surgery  [address] [facility] [address], [address]  [place] [address]  Phone: [phone]  Fax: [phone]  Follow Up Time: 2 weeks    Ash De Oliveira  Medical Oncology  [address] [place] [address]  Phone: [phone]  Fax: [phone]  Follow Up Time:

## 2023-07-26 NOTE — PROGRESS NOTE ADULT - ASSESSMENT
- AC/DVT:   - Antimicrobials:   - Diet: Diet, Low Fiber:   Ensure Surgery Cans or Servings Per Day:  1     Special Instructions for Nursing:  Advance diet to low fiber with 1 ensure surgery if patient tolerates 1 clear liquid tray (07-25-23 @ 22:34) [Available for Activation]  Diet, Clear Liquid:   Ensure Clear Cans or Servings Per Day:  1     Special Instructions for Nursing:  To include 1 Ensure clear, 1 Lemon Ice, 2 cups of hot water to make tea or broth.  Caffeinated coffee is permissible (07-25-23 @ 22:34) [Active]        - IVF: lactated ringers. 1000 milliLiter(s) (40 mL/Hr) IV Continuous <Continuous>    - Pain: acetaminophen   IVPB .. 1000 milliGRAM(s) IV Intermittent every 6 hours  oxyCODONE    IR 2.5 milliGRAM(s) Oral every 6 hours PRN  oxyCODONE    IR 5 milliGRAM(s) Oral every 6 hours PRN    - Wound care:   - Dispo:  72F s/p laparoscopic R colectomy for cancer 7/25. Patient hypotensive during PACU post-op period ()/(70-75) with MAP consistently >70, asymptomatic, did not require pressors. Preop BP was 105/72. Abdominal exam with appropriate incisional tenderness without significant distention, or bleeding/oozing from incision sites.     Plan:  - AC/DVT: None currently - consider SQH.  - Diet/IVF: LRD, if taking adequate PO discontinue LR (currently running @40cc/hr).  - Pain: Tylenol ATC, convert IV -> PO today. Oxy PRN.   - Dispo: Pending recovery course.

## 2023-07-26 NOTE — DISCHARGE NOTE PROVIDER - PROVIDER TOKENS
PROVIDER:[TOKEN:[[id number]],FOLLOWUP:[2 weeks]] PROVIDER:[TOKEN:[[id number]],FOLLOWUP:[2 weeks]],PROVIDER:[TOKEN:[[id number]]]

## 2023-07-26 NOTE — PHYSICAL THERAPY INITIAL EVALUATION ADULT - ADDITIONAL COMMENTS
as per pt, resides in a PH alone, +4 stairs to enter, able to stay on main floor, PTA, pt amb (I), (I) with ADLs. son will stay with her for a few days

## 2023-07-27 LAB
ANION GAP SERPL CALC-SCNC: 12 MMOL/L — SIGNIFICANT CHANGE UP (ref 5–17)
BUN SERPL-MCNC: 15 MG/DL — SIGNIFICANT CHANGE UP (ref 7–23)
CALCIUM SERPL-MCNC: 9.1 MG/DL — SIGNIFICANT CHANGE UP (ref 8.4–10.5)
CHLORIDE SERPL-SCNC: 99 MMOL/L — SIGNIFICANT CHANGE UP (ref 96–108)
CO2 SERPL-SCNC: 24 MMOL/L — SIGNIFICANT CHANGE UP (ref 22–31)
CREAT SERPL-MCNC: 0.62 MG/DL — SIGNIFICANT CHANGE UP (ref 0.5–1.3)
EGFR: 95 ML/MIN/1.73M2 — SIGNIFICANT CHANGE UP
GLUCOSE SERPL-MCNC: 167 MG/DL — HIGH (ref 70–99)
HCT VFR BLD CALC: 33.2 % — LOW (ref 34.5–45)
HGB BLD-MCNC: 10.1 G/DL — LOW (ref 11.5–15.5)
MAGNESIUM SERPL-MCNC: 2.4 MG/DL — SIGNIFICANT CHANGE UP (ref 1.6–2.6)
MCHC RBC-ENTMCNC: 25 PG — LOW (ref 27–34)
MCHC RBC-ENTMCNC: 30.4 GM/DL — LOW (ref 32–36)
MCV RBC AUTO: 82.2 FL — SIGNIFICANT CHANGE UP (ref 80–100)
NRBC # BLD: 0 /100 WBCS — SIGNIFICANT CHANGE UP (ref 0–0)
PHOSPHATE SERPL-MCNC: 3.4 MG/DL — SIGNIFICANT CHANGE UP (ref 2.5–4.5)
PLATELET # BLD AUTO: 343 K/UL — SIGNIFICANT CHANGE UP (ref 150–400)
POTASSIUM SERPL-MCNC: 4.1 MMOL/L — SIGNIFICANT CHANGE UP (ref 3.5–5.3)
POTASSIUM SERPL-SCNC: 4.1 MMOL/L — SIGNIFICANT CHANGE UP (ref 3.5–5.3)
RBC # BLD: 4.04 M/UL — SIGNIFICANT CHANGE UP (ref 3.8–5.2)
RBC # FLD: 20.2 % — HIGH (ref 10.3–14.5)
SODIUM SERPL-SCNC: 135 MMOL/L — SIGNIFICANT CHANGE UP (ref 135–145)
WBC # BLD: 8.73 K/UL — SIGNIFICANT CHANGE UP (ref 3.8–10.5)
WBC # FLD AUTO: 8.73 K/UL — SIGNIFICANT CHANGE UP (ref 3.8–10.5)

## 2023-07-27 PROCEDURE — 71045 X-RAY EXAM CHEST 1 VIEW: CPT | Mod: 26

## 2023-07-27 PROCEDURE — 74018 RADEX ABDOMEN 1 VIEW: CPT | Mod: 26

## 2023-07-27 RX ORDER — DEXTROSE MONOHYDRATE, SODIUM CHLORIDE, AND POTASSIUM CHLORIDE 50; .745; 4.5 G/1000ML; G/1000ML; G/1000ML
1000 INJECTION, SOLUTION INTRAVENOUS
Refills: 0 | Status: DISCONTINUED | OUTPATIENT
Start: 2023-07-27 | End: 2023-07-30

## 2023-07-27 RX ORDER — ACETAMINOPHEN 500 MG
1000 TABLET ORAL EVERY 6 HOURS
Refills: 0 | Status: COMPLETED | OUTPATIENT
Start: 2023-07-27 | End: 2023-07-27

## 2023-07-27 RX ORDER — PANTOPRAZOLE SODIUM 20 MG/1
40 TABLET, DELAYED RELEASE ORAL ONCE
Refills: 0 | Status: COMPLETED | OUTPATIENT
Start: 2023-07-27 | End: 2023-07-27

## 2023-07-27 RX ORDER — ALPRAZOLAM 0.25 MG
0.25 TABLET ORAL ONCE
Refills: 0 | Status: DISCONTINUED | OUTPATIENT
Start: 2023-07-27 | End: 2023-07-27

## 2023-07-27 RX ORDER — TETRACAINE/BENZOCAINE/BUTAMBEN 2%-14%-2%
1 OINTMENT (GRAM) TOPICAL EVERY 4 HOURS
Refills: 0 | Status: DISCONTINUED | OUTPATIENT
Start: 2023-07-27 | End: 2023-07-30

## 2023-07-27 RX ADMIN — HEPARIN SODIUM 5000 UNIT(S): 5000 INJECTION INTRAVENOUS; SUBCUTANEOUS at 14:02

## 2023-07-27 RX ADMIN — HEPARIN SODIUM 5000 UNIT(S): 5000 INJECTION INTRAVENOUS; SUBCUTANEOUS at 21:48

## 2023-07-27 RX ADMIN — Medication 400 MILLIGRAM(S): at 23:14

## 2023-07-27 RX ADMIN — Medication 0.25 MILLIGRAM(S): at 14:47

## 2023-07-27 RX ADMIN — PANTOPRAZOLE SODIUM 40 MILLIGRAM(S): 20 TABLET, DELAYED RELEASE ORAL at 00:16

## 2023-07-27 RX ADMIN — Medication 400 MILLIGRAM(S): at 06:38

## 2023-07-27 RX ADMIN — Medication 1000 MILLIGRAM(S): at 12:30

## 2023-07-27 RX ADMIN — HEPARIN SODIUM 5000 UNIT(S): 5000 INJECTION INTRAVENOUS; SUBCUTANEOUS at 05:33

## 2023-07-27 RX ADMIN — Medication 400 MILLIGRAM(S): at 12:04

## 2023-07-27 RX ADMIN — Medication 1000 MILLIGRAM(S): at 23:44

## 2023-07-27 RX ADMIN — DEXTROSE MONOHYDRATE, SODIUM CHLORIDE, AND POTASSIUM CHLORIDE 75 MILLILITER(S): 50; .745; 4.5 INJECTION, SOLUTION INTRAVENOUS at 06:56

## 2023-07-27 RX ADMIN — ONDANSETRON 4 MILLIGRAM(S): 8 TABLET, FILM COATED ORAL at 02:33

## 2023-07-27 RX ADMIN — Medication 400 MILLIGRAM(S): at 17:22

## 2023-07-27 RX ADMIN — Medication 1000 MILLIGRAM(S): at 07:08

## 2023-07-27 NOTE — PROVIDER CONTACT NOTE (OTHER) - ASSESSMENT
pt previously complained of heartburn. RN notified provider who ordered protonix. No relief. pt then began vomiting dark brown/black output which she stated was the color of the broth she had for dinner. pt had multiple episodes of emesis. when vomiting, pt also had an episode of diarrhea.

## 2023-07-27 NOTE — PROGRESS NOTE ADULT - SUBJECTIVE AND OBJECTIVE BOX
Green Team Surgery Daily Progress Note    Subjective:   Patient seen at bedside this AM. Reports significant nausea and multiple episodes of dark brown vomiting overnight. She reports mild abdominal pain. Denies chest pain, SOB.    24h Events:   - Overnight, multiple episodes of dark brown emesis    Objective:  Vital Signs  T(C): 36.8 (07-27 @ 05:59), Max: 37.1 (07-26 @ 21:31)  HR: 85 (07-27 @ 05:59) (75 - 85)  BP: 114/78 (07-27 @ 05:59) (96/63 - 118/78)  RR: 18 (07-27 @ 05:59) (16 - 18)  SpO2: 94% (07-27 @ 05:59) (94% - 98%)  07-26-23 @ 07:01  -  07-27-23 @ 07:00  --------------------------------------------------------  IN:  Total IN: 0 mL    OUT:    Indwelling Catheter - Urethral (mL): 175 mL    Voided (mL): 500 mL  Total OUT: 675 mL    Total NET: -675 mL          Physical Exam:  GEN: resting in bed with emesis basin, appearing fatigued  RESP: no increased WOB  ABD: mild distension compared to previous exam, mild tenderness to deep palpation, without rebound tenderness or guarding  EXTR: warm, well-perfused without gross deformities; spontaneous movement in b/l U/L extrem  NEURO: AAOx3    Labs:                        10.1   8.73  )-----------( 343      ( 27 Jul 2023 07:34 )             33.2   07-27    135  |  99  |  15  ----------------------------<  167<H>  4.1   |  24  |  0.62    Ca    9.1      27 Jul 2023 07:34  Phos  3.4     07-27  Mg     2.4     07-27                Medications:   MEDICATIONS  (STANDING):  acetaminophen   IVPB .. 1000 milliGRAM(s) IV Intermittent every 6 hours  dextrose 5% + sodium chloride 0.45% with potassium chloride 20 mEq/L 1000 milliLiter(s) (75 mL/Hr) IV Continuous <Continuous>  heparin   Injectable 5000 Unit(s) SubCutaneous every 8 hours    MEDICATIONS  (PRN):  nalbuphine Injectable 2.5 milliGRAM(s) IV Push every 6 hours PRN Pruritus  naloxone Injectable 0.1 milliGRAM(s) IV Push every 3 minutes PRN For ANY of the following changes in patient status:  A. RR LESS THAN 10 breaths per minute, B. Oxygen saturation LESS THAN 90%, C. Sedation score of 6  ondansetron Injectable 4 milliGRAM(s) IV Push every 6 hours PRN Nausea  oxyCODONE    IR 2.5 milliGRAM(s) Oral every 6 hours PRN Moderate Pain (4 - 6)  oxyCODONE    IR 5 milliGRAM(s) Oral every 6 hours PRN Severe Pain (7 - 10)           Green Team Surgery Daily Progress Note    Subjective:   Patient seen at bedside this AM. Reports significant nausea and multiple episodes of dark brown vomiting overnight. She reports one episode of diarrhea. She reports mild abdominal pain. Denies chest pain, SOB.    24h Events:   - Overnight, multiple episodes of dark brown emesis    Objective:  Vital Signs  T(C): 36.8 (07-27 @ 05:59), Max: 37.1 (07-26 @ 21:31)  HR: 85 (07-27 @ 05:59) (75 - 85)  BP: 114/78 (07-27 @ 05:59) (96/63 - 118/78)  RR: 18 (07-27 @ 05:59) (16 - 18)  SpO2: 94% (07-27 @ 05:59) (94% - 98%)  07-26-23 @ 07:01  -  07-27-23 @ 07:00  --------------------------------------------------------  IN:  Total IN: 0 mL    OUT:    Indwelling Catheter - Urethral (mL): 175 mL    Voided (mL): 500 mL  Total OUT: 675 mL    Total NET: -675 mL          Physical Exam:  GEN: resting in bed with emesis basin, appearing fatigued  RESP: no increased WOB  ABD: mild distension compared to previous exam, mild tenderness to deep palpation, without rebound tenderness or guarding  EXTR: warm, well-perfused without gross deformities; spontaneous movement in b/l U/L extrem  NEURO: AAOx3    Labs:                        10.1   8.73  )-----------( 343      ( 27 Jul 2023 07:34 )             33.2   07-27    135  |  99  |  15  ----------------------------<  167<H>  4.1   |  24  |  0.62    Ca    9.1      27 Jul 2023 07:34  Phos  3.4     07-27  Mg     2.4     07-27                Medications:   MEDICATIONS  (STANDING):  acetaminophen   IVPB .. 1000 milliGRAM(s) IV Intermittent every 6 hours  dextrose 5% + sodium chloride 0.45% with potassium chloride 20 mEq/L 1000 milliLiter(s) (75 mL/Hr) IV Continuous <Continuous>  heparin   Injectable 5000 Unit(s) SubCutaneous every 8 hours    MEDICATIONS  (PRN):  nalbuphine Injectable 2.5 milliGRAM(s) IV Push every 6 hours PRN Pruritus  naloxone Injectable 0.1 milliGRAM(s) IV Push every 3 minutes PRN For ANY of the following changes in patient status:  A. RR LESS THAN 10 breaths per minute, B. Oxygen saturation LESS THAN 90%, C. Sedation score of 6  ondansetron Injectable 4 milliGRAM(s) IV Push every 6 hours PRN Nausea  oxyCODONE    IR 2.5 milliGRAM(s) Oral every 6 hours PRN Moderate Pain (4 - 6)  oxyCODONE    IR 5 milliGRAM(s) Oral every 6 hours PRN Severe Pain (7 - 10)

## 2023-07-27 NOTE — CHART NOTE - NSCHARTNOTEFT_GEN_A_CORE
General Surgery Post op Check    Pt seen and examined without complaints. Pain is controlled. Denies SOB/CP, dizziness, lightheadedness, N/V.     Vital Signs Last 24 Hrs  T(C): 36.2 (25 Jul 2023 23:32), Max: 36.5 (25 Jul 2023 12:47)  T(F): 97.2 (25 Jul 2023 23:32), Max: 97.7 (25 Jul 2023 12:47)  HR: 82 (26 Jul 2023 02:00) (82 - 96)  BP: 105/58 (26 Jul 2023 02:00) (98/54 - 106/54)  BP(mean): 75 (26 Jul 2023 02:00) (70 - 78)  RR: 16 (26 Jul 2023 02:00) (15 - 18)  SpO2: 98% (26 Jul 2023 02:00) (95% - 99%)    Parameters below as of 26 Jul 2023 02:00  Patient On (Oxygen Delivery Method): nasal cannula  O2 Flow (L/min): 2      I&O's Summary    25 Jul 2023 07:01  -  26 Jul 2023 02:58  --------------------------------------------------------  IN: 200 mL / OUT: 320 mL / NET: -120 mL        Physical Exam  Gen: NAD, A&Ox3  Pulm: No respiratory distress, no subcostal retractions  CV: RRR, no JVD  Abd: Soft, appropriately tender, mildly distended, incisions c/d/i  Extremities:  FROM, warm and well perfused, equal bilateral muscle strength  ; Singh with 100c of clear urine    A/P: 72y Female 4 hours s/p laparoscopic R colectomy. Patient hypotensive during PACU post-op period ()/(70-75) with MAP consistently >70, asymptomatic.  Patient did not required jayden gtt to meet BP goals. Notably, BP prior surgery was 105/72. Abdominal exam with appropriate incisional tenderness without significant distention, or bleeding/oozing from incision sites.   -Encouraged OOB  -Diet: CLD  -Strict I&O's  -Analgesia and antiemetics as needed
General Surgery Post op Check    Pt seen and examined without complaints. Pain is controlled. Denies SOB/CP, dizziness, lightheadedness, N/V.     Vital Signs Last 24 Hrs  T(C): 36.2 (25 Jul 2023 23:32), Max: 36.5 (25 Jul 2023 12:47)  T(F): 97.2 (25 Jul 2023 23:32), Max: 97.7 (25 Jul 2023 12:47)  HR: 82 (26 Jul 2023 02:00) (82 - 96)  BP: 105/58 (26 Jul 2023 02:00) (98/54 - 106/54)  BP(mean): 75 (26 Jul 2023 02:00) (70 - 78)  RR: 16 (26 Jul 2023 02:00) (15 - 18)  SpO2: 98% (26 Jul 2023 02:00) (95% - 99%)    Parameters below as of 26 Jul 2023 02:00  Patient On (Oxygen Delivery Method): nasal cannula  O2 Flow (L/min): 2      I&O's Summary    25 Jul 2023 07:01  -  26 Jul 2023 02:58  --------------------------------------------------------  IN: 200 mL / OUT: 320 mL / NET: -120 mL        Physical Exam  Gen: NAD, A&Ox3  Pulm: No respiratory distress, no subcostal retractions  CV: RRR, no JVD  Abd: Soft, appropriately tender, mildly distended, incisions c/d/i  Extremities:  FROM, warm and well perfused, equal bilateral muscle strength  ; Singh with 100c of clear urine    A/P: 72y Female 4 hours s/p laparoscopic R colectomy. Patient hypotensive during PACU post-op period ()/(70-75) with MAP consistently >70, asymptomatic.  Patient did not required jayden gtt to meet BP goals. Notably, BP prior surgery was 105/72. Abdominal exam with appropriate incisional tenderness without significant distention, or bleeding/oozing from incision sites.   -Encouraged OOB  -Diet: CLD  -Strict I&O's  -Analgesia and antiemetics as needed
NGT placed with immediate return of 600cc bilious fluid.  CXR ordered to confirm placement.    SAMMY Hubbard PA-C , pager # [phone]
Education Note    Patient seen for: ERP LFD education     Information obtained from: electronic medical record and patient     Patient reports: NPO x 1 day, plan to advance to CLD--> low fiber diet once able to tolerate. Weight stable, no other nutrition risks identified at this time.     Education Provided: Provided low-fiber nutrition therapy including importance of avoiding  fiber rich foods, fresh fruits/vegetables, whole grains, and added fiber in processed foods. Discussed chewing foods well and adequate hydration and protein intake. Discussed gradual reintroduction of fiber back into diet once cleared by MD. Pt verbalized understanding and accepted written handout. Patient with no nutrition-related questions at this time. Made aware RD remains available as needed.     Nutrition Dx: Food and Nutrition related knowledge deficit  Etiology: limited prior nutrition- related education  Signs and Symptoms: s/p colon resection 7/25, Pt with questions on low fiber diet     Recommendations:   1) Reinforce diet education as able.   2) RD remains available to answer any questions regarding diet education  3) RD to follow-up per protocol    Alyssa Dunn RD CDN pager # [phone] or TEAMS

## 2023-07-27 NOTE — PROGRESS NOTE ADULT - ASSESSMENT
· Assessment	  72F s/p laparoscopic R colectomy for cancer 7/25. Patient hypotensive during PACU post-op period ()/(70-75) with MAP consistently >70, asymptomatic, did not require pressors. Preop BP was 105/72. Abdominal exam with appropriate incisional tenderness without significant distention, or bleeding/oozing from incision sites.     Plan:  - Diet/IVF: NPO with sips and chips.  - F/u labs   -  F/U Abdominal xray iso dark brown emesis  - NGT if patient vomits again  - Dispo: Pending recovery course.     Green Team, Surgery  [id number] · Assessment	  72F s/p laparoscopic R colectomy for cancer 7/25. Patient hypotensive during PACU post-op period ()/(70-75) with MAP consistently >70, asymptomatic, did not require pressors. Preop BP was 105/72. Abdominal exam with appropriate incisional tenderness without significant distention, or bleeding/oozing from incision sites.     Plan:  - Diet/IVF: NPO with sips and chips.  - F/u labs ()  - F/U Abdominal xray iso dark brown emesis  - NGT if patient vomits again  - AC: Hep SubQ PPx  - Dispo: Pending recovery course.     Green Team, Surgery  [id number]

## 2023-07-28 LAB
ANION GAP SERPL CALC-SCNC: 12 MMOL/L — SIGNIFICANT CHANGE UP (ref 5–17)
BUN SERPL-MCNC: 18 MG/DL — SIGNIFICANT CHANGE UP (ref 7–23)
CALCIUM SERPL-MCNC: 9.1 MG/DL — SIGNIFICANT CHANGE UP (ref 8.4–10.5)
CHLORIDE SERPL-SCNC: 98 MMOL/L — SIGNIFICANT CHANGE UP (ref 96–108)
CO2 SERPL-SCNC: 26 MMOL/L — SIGNIFICANT CHANGE UP (ref 22–31)
CREAT SERPL-MCNC: 0.68 MG/DL — SIGNIFICANT CHANGE UP (ref 0.5–1.3)
EGFR: 92 ML/MIN/1.73M2 — SIGNIFICANT CHANGE UP
GLUCOSE SERPL-MCNC: 139 MG/DL — HIGH (ref 70–99)
HCT VFR BLD CALC: 33.3 % — LOW (ref 34.5–45)
HGB BLD-MCNC: 10.2 G/DL — LOW (ref 11.5–15.5)
MAGNESIUM SERPL-MCNC: 2.3 MG/DL — SIGNIFICANT CHANGE UP (ref 1.6–2.6)
MCHC RBC-ENTMCNC: 25.3 PG — LOW (ref 27–34)
MCHC RBC-ENTMCNC: 30.6 GM/DL — LOW (ref 32–36)
MCV RBC AUTO: 82.6 FL — SIGNIFICANT CHANGE UP (ref 80–100)
NRBC # BLD: 0 /100 WBCS — SIGNIFICANT CHANGE UP (ref 0–0)
PHOSPHATE SERPL-MCNC: 3.2 MG/DL — SIGNIFICANT CHANGE UP (ref 2.5–4.5)
PLATELET # BLD AUTO: 402 K/UL — HIGH (ref 150–400)
POTASSIUM SERPL-MCNC: 4.2 MMOL/L — SIGNIFICANT CHANGE UP (ref 3.5–5.3)
POTASSIUM SERPL-SCNC: 4.2 MMOL/L — SIGNIFICANT CHANGE UP (ref 3.5–5.3)
RBC # BLD: 4.03 M/UL — SIGNIFICANT CHANGE UP (ref 3.8–5.2)
RBC # FLD: 20.3 % — HIGH (ref 10.3–14.5)
SODIUM SERPL-SCNC: 136 MMOL/L — SIGNIFICANT CHANGE UP (ref 135–145)
WBC # BLD: 9.38 K/UL — SIGNIFICANT CHANGE UP (ref 3.8–10.5)
WBC # FLD AUTO: 9.38 K/UL — SIGNIFICANT CHANGE UP (ref 3.8–10.5)

## 2023-07-28 RX ORDER — ACETAMINOPHEN 500 MG
1000 TABLET ORAL ONCE
Refills: 0 | Status: COMPLETED | OUTPATIENT
Start: 2023-07-28 | End: 2023-07-28

## 2023-07-28 RX ADMIN — Medication 400 MILLIGRAM(S): at 15:00

## 2023-07-28 RX ADMIN — HEPARIN SODIUM 5000 UNIT(S): 5000 INJECTION INTRAVENOUS; SUBCUTANEOUS at 05:08

## 2023-07-28 RX ADMIN — Medication 1000 MILLIGRAM(S): at 15:20

## 2023-07-28 RX ADMIN — HEPARIN SODIUM 5000 UNIT(S): 5000 INJECTION INTRAVENOUS; SUBCUTANEOUS at 21:39

## 2023-07-28 RX ADMIN — HEPARIN SODIUM 5000 UNIT(S): 5000 INJECTION INTRAVENOUS; SUBCUTANEOUS at 13:56

## 2023-07-28 NOTE — PROGRESS NOTE ADULT - ASSESSMENT
72F s/p laparoscopic R colectomy for cancer 7/25. NG placed 7/27 for severe nausea, PO intolerance, immediate return of 600cc gastric fluid.     Plan:  - AC/DVT: heparin   Injectable 5000 Unit(s) SubCutaneous every 8 hours  - Diet: NPO/NGT.  - IVF: D5 1/2 NS + 20mEq KCl @ 75cc/hr  - Dispo: DC to home once tolerating PO, ROBF.  72F s/p laparoscopic R colectomy for cancer 7/25. NG placed 7/27 for severe nausea, PO intolerance, immediate return of 600cc gastric fluid.     Plan:  - AC/DVT: heparin   Injectable 5000 Unit(s) SubCutaneous every 8 hours  - Diet: NPO/NGT.  - IVF: D5 1/2 NS + 20mEq KCl @ 75cc/hr  - Possible clamp trial if NGT output decreases    Green Surgery  [id number]

## 2023-07-28 NOTE — PROGRESS NOTE ADULT - SUBJECTIVE AND OBJECTIVE BOX
[facility] Daily Resident Progress Note    OVERNIGHT: BRIANEON.     SUBJECTIVE: Pt seen and examined at bedside. Denies N/V since NG placement. -Flatus/-BM     OBJECTIVE:  Vital Signs Last 24 Hrs  T(C): 36.7 (28 Jul 2023 01:25), Max: 37 (27 Jul 2023 17:57)  T(F): 98 (28 Jul 2023 01:25), Max: 98.6 (27 Jul 2023 17:57)  HR: 100 (28 Jul 2023 01:25) (85 - 100)  BP: 118/79 (28 Jul 2023 01:25) (110/70 - 124/81)  BP(mean): --  RR: 17 (28 Jul 2023 01:25) (17 - 18)  SpO2: 92% (28 Jul 2023 01:25) (92% - 96%)    Physical Exam:  General Appearance:   Chest:   CV:   Abdomen:   Extremities:     dextrose 5% + sodium chloride 0.45% with potassium chloride 20 mEq/L milliLiter(s) (75 mL/Hr)    Medications:  MEDICATIONS  (STANDING):  dextrose 5% + sodium chloride 0.45% with potassium chloride 20 mEq/L 1000 milliLiter(s) (75 mL/Hr) IV Continuous <Continuous>  heparin   Injectable 5000 Unit(s) SubCutaneous every 8 hours    MEDICATIONS  (PRN):  nalbuphine Injectable 2.5 milliGRAM(s) IV Push every 6 hours PRN Pruritus  naloxone Injectable 0.1 milliGRAM(s) IV Push every 3 minutes PRN For ANY of the following changes in patient status:  A. RR LESS THAN 10 breaths per minute, B. Oxygen saturation LESS THAN 90%, C. Sedation score of 6  ondansetron Injectable 4 milliGRAM(s) IV Push every 6 hours PRN Nausea  tetracaine/benzocaine/butamben Spray 1 Spray(s) Topical every 4 hours PRN throat discomfort due to NGT    Allergies:  penicillin (Unknown)    Labs:  07-27    135  |  99  |  15  ----------------------------<  167<H>  4.1   |  24  |  0.62    Ca    9.1      27 Jul 2023 07:34  Phos  3.4     07-27  Mg     2.4     07-27                        10.1   8.73  )-----------( 343      ( 27 Jul 2023 07:34 )             33.2      [facility] Daily Resident Progress Note    OVERNIGHT: NAEON.     SUBJECTIVE: Pt seen and examined at bedside. Denies N/V since NG placement. -Flatus/-BM     OBJECTIVE:  Vital Signs Last 24 Hrs  T(C): 36.7 (28 Jul 2023 01:25), Max: 37 (27 Jul 2023 17:57)  T(F): 98 (28 Jul 2023 01:25), Max: 98.6 (27 Jul 2023 17:57)  HR: 100 (28 Jul 2023 01:25) (85 - 100)  BP: 118/79 (28 Jul 2023 01:25) (110/70 - 124/81)  BP(mean): --  RR: 17 (28 Jul 2023 01:25) (17 - 18)  SpO2: 92% (28 Jul 2023 01:25) (92% - 96%)    Physical Exam:  General Appearance: NAD, comfortable.   Chest: Equal expansion bilaterally, no inc WOB.   CV: WWP.  Abdomen: Soft NTND, NGT with bilious output, incisions well healing   Extremities: No gross abnormality appreciated.     dextrose 5% + sodium chloride 0.45% with potassium chloride 20 mEq/L milliLiter(s) (75 mL/Hr)    Medications:  MEDICATIONS  (STANDING):  dextrose 5% + sodium chloride 0.45% with potassium chloride 20 mEq/L 1000 milliLiter(s) (75 mL/Hr) IV Continuous <Continuous>  heparin   Injectable 5000 Unit(s) SubCutaneous every 8 hours    MEDICATIONS  (PRN):  nalbuphine Injectable 2.5 milliGRAM(s) IV Push every 6 hours PRN Pruritus  naloxone Injectable 0.1 milliGRAM(s) IV Push every 3 minutes PRN For ANY of the following changes in patient status:  A. RR LESS THAN 10 breaths per minute, B. Oxygen saturation LESS THAN 90%, C. Sedation score of 6  ondansetron Injectable 4 milliGRAM(s) IV Push every 6 hours PRN Nausea  tetracaine/benzocaine/butamben Spray 1 Spray(s) Topical every 4 hours PRN throat discomfort due to NGT    Allergies:  penicillin (Unknown)    Labs:  07-27    135  |  99  |  15  ----------------------------<  167<H>  4.1   |  24  |  0.62    Ca    9.1      27 Jul 2023 07:34  Phos  3.4     07-27  Mg     2.4     07-27                        10.1   8.73  )-----------( 343      ( 27 Jul 2023 07:34 )             33.2

## 2023-07-29 LAB
ANION GAP SERPL CALC-SCNC: 14 MMOL/L — SIGNIFICANT CHANGE UP (ref 5–17)
BUN SERPL-MCNC: 19 MG/DL — SIGNIFICANT CHANGE UP (ref 7–23)
CALCIUM SERPL-MCNC: 9.1 MG/DL — SIGNIFICANT CHANGE UP (ref 8.4–10.5)
CHLORIDE SERPL-SCNC: 100 MMOL/L — SIGNIFICANT CHANGE UP (ref 96–108)
CO2 SERPL-SCNC: 24 MMOL/L — SIGNIFICANT CHANGE UP (ref 22–31)
CREAT SERPL-MCNC: 0.59 MG/DL — SIGNIFICANT CHANGE UP (ref 0.5–1.3)
EGFR: 96 ML/MIN/1.73M2 — SIGNIFICANT CHANGE UP
GLUCOSE SERPL-MCNC: 116 MG/DL — HIGH (ref 70–99)
HCT VFR BLD CALC: 30.7 % — LOW (ref 34.5–45)
HGB BLD-MCNC: 9.3 G/DL — LOW (ref 11.5–15.5)
MAGNESIUM SERPL-MCNC: 2.4 MG/DL — SIGNIFICANT CHANGE UP (ref 1.6–2.6)
MCHC RBC-ENTMCNC: 24.9 PG — LOW (ref 27–34)
MCHC RBC-ENTMCNC: 30.3 GM/DL — LOW (ref 32–36)
MCV RBC AUTO: 82.3 FL — SIGNIFICANT CHANGE UP (ref 80–100)
NRBC # BLD: 0 /100 WBCS — SIGNIFICANT CHANGE UP (ref 0–0)
PHOSPHATE SERPL-MCNC: 3.4 MG/DL — SIGNIFICANT CHANGE UP (ref 2.5–4.5)
PLATELET # BLD AUTO: 397 K/UL — SIGNIFICANT CHANGE UP (ref 150–400)
POTASSIUM SERPL-MCNC: 4.1 MMOL/L — SIGNIFICANT CHANGE UP (ref 3.5–5.3)
POTASSIUM SERPL-SCNC: 4.1 MMOL/L — SIGNIFICANT CHANGE UP (ref 3.5–5.3)
RBC # BLD: 3.73 M/UL — LOW (ref 3.8–5.2)
RBC # FLD: 20.2 % — HIGH (ref 10.3–14.5)
SODIUM SERPL-SCNC: 138 MMOL/L — SIGNIFICANT CHANGE UP (ref 135–145)
WBC # BLD: 7.42 K/UL — SIGNIFICANT CHANGE UP (ref 3.8–10.5)
WBC # FLD AUTO: 7.42 K/UL — SIGNIFICANT CHANGE UP (ref 3.8–10.5)

## 2023-07-29 RX ADMIN — HEPARIN SODIUM 5000 UNIT(S): 5000 INJECTION INTRAVENOUS; SUBCUTANEOUS at 13:44

## 2023-07-29 RX ADMIN — HEPARIN SODIUM 5000 UNIT(S): 5000 INJECTION INTRAVENOUS; SUBCUTANEOUS at 05:52

## 2023-07-29 RX ADMIN — HEPARIN SODIUM 5000 UNIT(S): 5000 INJECTION INTRAVENOUS; SUBCUTANEOUS at 21:14

## 2023-07-29 NOTE — PROGRESS NOTE ADULT - SUBJECTIVE AND OBJECTIVE BOX
General Surgery Daily Resident Progress Note    OVERNIGHT: No acute overnight events.    SUBJECTIVE: Pt seen and examined at bedside. Pain well controlled. No nausea or vomiting. Passing gas and having bowel movement.  Patient expressed that she would want to go on bland LRD diet consisting of tea, milk, boiled potatoes     OBJECTIVE:  Vital Signs Last 24 Hrs  T(C): 36.4 (29 Jul 2023 21:21), Max: 37 (29 Jul 2023 17:03)  T(F): 97.5 (29 Jul 2023 21:21), Max: 98.6 (29 Jul 2023 17:03)  HR: 91 (29 Jul 2023 21:21) (77 - 92)  BP: 128/86 (29 Jul 2023 21:21) (107/71 - 128/86)  BP(mean): --  RR: 18 (29 Jul 2023 21:21) (17 - 18)  SpO2: 97% (29 Jul 2023 21:21) (94% - 97%)    Parameters below as of 29 Jul 2023 21:21  Patient On (Oxygen Delivery Method): room air      Physical Exam:  General Appearance: No acute distress, resting comfortably.   Chest: Equal expansion bilaterally, no increased WOB.   CV: WWP.   Abdomen:   Extremities: No gross deformity appreciated.     LABS:                        9.3    7.42  )-----------( 397      ( 29 Jul 2023 07:19 )             30.7     07-29    138  |  100  |  19  ----------------------------<  116<H>  4.1   |  24  |  0.59    Ca    9.1      29 Jul 2023 07:20  Phos  3.4     07-29  Mg     2.4     07-29        Urinalysis Basic - ( 29 Jul 2023 07:20 )    Color: x / Appearance: x / SG: x / pH: x  Gluc: 116 mg/dL / Ketone: x  / Bili: x / Urobili: x   Blood: x / Protein: x / Nitrite: x   Leuk Esterase: x / RBC: x / WBC x   Sq Epi: x / Non Sq Epi: x / Bacteria: x

## 2023-07-29 NOTE — PROGRESS NOTE ADULT - SUBJECTIVE AND OBJECTIVE BOX
[facility] Daily Resident Progress Note    OVERNIGHT:  No acute events.     SUBJECTIVE: Pt seen and examined at bedside.     OBJECTIVE:  Vital Signs Last 24 Hrs  T(C): 36.8 (29 Jul 2023 01:06), Max: 37.1 (28 Jul 2023 09:05)  T(F): 98.3 (29 Jul 2023 01:06), Max: 98.7 (28 Jul 2023 09:05)  HR: 77 (29 Jul 2023 01:06) (77 - 102)  BP: 111/71 (29 Jul 2023 01:06) (108/70 - 122/74)  BP(mean): --  RR: 18 (29 Jul 2023 01:06) (17 - 18)  SpO2: 95% (29 Jul 2023 01:06) (91% - 95%)    Parameters below as of 29 Jul 2023 01:06  Patient On (Oxygen Delivery Method): room air      Physical Exam:  General Appearance:   Chest:   CV:  Abdomen:   Extremities:     dextrose 5% + sodium chloride 0.45% with potassium chloride 20 mEq/L milliLiter(s) (75 mL/Hr)      Medications:  MEDICATIONS  (STANDING):  dextrose 5% + sodium chloride 0.45% with potassium chloride 20 mEq/L 1000 milliLiter(s) (75 mL/Hr) IV Continuous <Continuous>  heparin   Injectable 5000 Unit(s) SubCutaneous every 8 hours    MEDICATIONS  (PRN):  nalbuphine Injectable 2.5 milliGRAM(s) IV Push every 6 hours PRN Pruritus  naloxone Injectable 0.1 milliGRAM(s) IV Push every 3 minutes PRN For ANY of the following changes in patient status:  A. RR LESS THAN 10 breaths per minute, B. Oxygen saturation LESS THAN 90%, C. Sedation score of 6  ondansetron Injectable 4 milliGRAM(s) IV Push every 6 hours PRN Nausea  tetracaine/benzocaine/butamben Spray 1 Spray(s) Topical every 4 hours PRN throat discomfort due to NGT    Allergies:  penicillin (Unknown)      Labs:  07-28    136  |  98  |  18  ----------------------------<  139<H>  4.2   |  26  |  0.68    Ca    9.1      28 Jul 2023 06:42  Phos  3.2     07-28  Mg     2.3     07-28                            10.2   9.38  )-----------( 402      ( 28 Jul 2023 06:42 )             33.3      [facility] Daily Resident Progress Note    OVERNIGHT:  No acute events.     SUBJECTIVE: Pt seen and examined at bedside. Tolerating diet, passing gas. Complaining of diarrhea overnight.     OBJECTIVE:  Vital Signs Last 24 Hrs  T(C): 36.8 (29 Jul 2023 01:06), Max: 37.1 (28 Jul 2023 09:05)  T(F): 98.3 (29 Jul 2023 01:06), Max: 98.7 (28 Jul 2023 09:05)  HR: 77 (29 Jul 2023 01:06) (77 - 102)  BP: 111/71 (29 Jul 2023 01:06) (108/70 - 122/74)  BP(mean): --  RR: 18 (29 Jul 2023 01:06) (17 - 18)  SpO2: 95% (29 Jul 2023 01:06) (91% - 95%)    Parameters below as of 29 Jul 2023 01:06  Patient On (Oxygen Delivery Method): room air      PHYSICAL EXAM:  GENERAL: NAD, lying in bed comfortably  HEAD:  Atraumatic, Normocephalic  EYES: EOMI, PERRLA, conjunctiva and sclera clear  ENT: Moist mucous membranes  NECK: Supple  CHEST/LUNG:  Unlabored respirations  HEART: Regular rate and rhythm  ABDOMEN: Soft, appropriately tender, Nondistended. Incision c/d/i  EXTREMITIES:  2+ Peripheral Pulses, brisk capillary refill. No clubbing, cyanosis, or edema  NERVOUS SYSTEM:  Alert & Oriented X3, speech clear. No deficits   MSK: FROM all 4 extremities, full and equal strength  SKIN: No rashes or lesions    dextrose 5% + sodium chloride 0.45% with potassium chloride 20 mEq/L milliLiter(s) (75 mL/Hr)      Medications:  MEDICATIONS  (STANDING):  dextrose 5% + sodium chloride 0.45% with potassium chloride 20 mEq/L 1000 milliLiter(s) (75 mL/Hr) IV Continuous <Continuous>  heparin   Injectable 5000 Unit(s) SubCutaneous every 8 hours    MEDICATIONS  (PRN):  nalbuphine Injectable 2.5 milliGRAM(s) IV Push every 6 hours PRN Pruritus  naloxone Injectable 0.1 milliGRAM(s) IV Push every 3 minutes PRN For ANY of the following changes in patient status:  A. RR LESS THAN 10 breaths per minute, B. Oxygen saturation LESS THAN 90%, C. Sedation score of 6  ondansetron Injectable 4 milliGRAM(s) IV Push every 6 hours PRN Nausea  tetracaine/benzocaine/butamben Spray 1 Spray(s) Topical every 4 hours PRN throat discomfort due to NGT    Allergies:  penicillin (Unknown)      Labs:  07-28    136  |  98  |  18  ----------------------------<  139<H>  4.2   |  26  |  0.68    Ca    9.1      28 Jul 2023 06:42  Phos  3.2     07-28  Mg     2.3     07-28                            10.2   9.38  )-----------( 402      ( 28 Jul 2023 06:42 )             33.3

## 2023-07-29 NOTE — PROGRESS NOTE ADULT - ASSESSMENT
72F s/p laparoscopic R colectomy for cancer 7/25. NG placed 7/27 for severe nausea, PO intolerance, immediate return of 600cc gastric fluid.     Plan:  - AC/DVT: heparin   Injectable 5000 Unit(s) SubCutaneous every 8 hours  - Passed clamp trial, NGT dc'd   - Diet: LRD  - Monitor bowel fxn    [facility]  [id number]

## 2023-07-29 NOTE — PROGRESS NOTE ADULT - ASSESSMENT
72F s/p laparoscopic R colectomy for cancer 7/25. NG placed 7/27 for severe nausea, PO intolerance, immediate return of 600cc gastric fluid.     Plan:  - AC/DVT: heparin   Injectable 5000 Unit(s) SubCutaneous every 8 hours  - Diet: NPO/NGT.  - IVF: D5 1/2 NS + 20mEq KCl @ 75cc/hr  - Possible clamp trial if NGT output decreases    [facility]  [id number]   72F s/p laparoscopic R colectomy for cancer 7/25. NG placed 7/27 for severe nausea, PO intolerance, immediate return of 600cc gastric fluid.     Plan:  - AC/DVT: heparin   Injectable 5000 Unit(s) SubCutaneous every 8 hours  - Diet:   - If diet tolerated, discharge home      [facility]  [id number]   72F s/p laparoscopic R colectomy for cancer 7/25. NG placed 7/27 for severe nausea, PO intolerance, immediate return of 600cc gastric fluid.     Plan:  - AC/DVT: heparin   Injectable 5000 Unit(s) SubCutaneous every 8 hours  - Diet: NPO, advance to CLD  - If diet tolerated, discharge home      [facility]  [id number]   72F s/p laparoscopic R colectomy for cancer 7/25. NG placed 7/27 for severe nausea, PO intolerance, immediate return of 600cc gastric fluid.     Plan:  - AC/DVT: heparin   Injectable 5000 Unit(s) SubCutaneous every 8 hours  - Passed clamp trial, NGT dc'd   - Diet: NPO, advance to CLD  - Monitor bowel fxn    [facility]  [id number]

## 2023-07-30 LAB
ANION GAP SERPL CALC-SCNC: 13 MMOL/L — SIGNIFICANT CHANGE UP (ref 5–17)
BUN SERPL-MCNC: 15 MG/DL — SIGNIFICANT CHANGE UP (ref 7–23)
CALCIUM SERPL-MCNC: 9.2 MG/DL — SIGNIFICANT CHANGE UP (ref 8.4–10.5)
CHLORIDE SERPL-SCNC: 104 MMOL/L — SIGNIFICANT CHANGE UP (ref 96–108)
CO2 SERPL-SCNC: 19 MMOL/L — LOW (ref 22–31)
CREAT SERPL-MCNC: 0.58 MG/DL — SIGNIFICANT CHANGE UP (ref 0.5–1.3)
EGFR: 96 ML/MIN/1.73M2 — SIGNIFICANT CHANGE UP
GLUCOSE SERPL-MCNC: 113 MG/DL — HIGH (ref 70–99)
HCT VFR BLD CALC: 31.4 % — LOW (ref 34.5–45)
HGB BLD-MCNC: 9.4 G/DL — LOW (ref 11.5–15.5)
MAGNESIUM SERPL-MCNC: 2.5 MG/DL — SIGNIFICANT CHANGE UP (ref 1.6–2.6)
MCHC RBC-ENTMCNC: 24.7 PG — LOW (ref 27–34)
MCHC RBC-ENTMCNC: 29.9 GM/DL — LOW (ref 32–36)
MCV RBC AUTO: 82.4 FL — SIGNIFICANT CHANGE UP (ref 80–100)
NRBC # BLD: 0 /100 WBCS — SIGNIFICANT CHANGE UP (ref 0–0)
PHOSPHATE SERPL-MCNC: 3.7 MG/DL — SIGNIFICANT CHANGE UP (ref 2.5–4.5)
PLATELET # BLD AUTO: 443 K/UL — HIGH (ref 150–400)
POTASSIUM SERPL-MCNC: 4 MMOL/L — SIGNIFICANT CHANGE UP (ref 3.5–5.3)
POTASSIUM SERPL-SCNC: 4 MMOL/L — SIGNIFICANT CHANGE UP (ref 3.5–5.3)
RBC # BLD: 3.81 M/UL — SIGNIFICANT CHANGE UP (ref 3.8–5.2)
RBC # FLD: 20.1 % — HIGH (ref 10.3–14.5)
SODIUM SERPL-SCNC: 136 MMOL/L — SIGNIFICANT CHANGE UP (ref 135–145)
WBC # BLD: 5.94 K/UL — SIGNIFICANT CHANGE UP (ref 3.8–10.5)
WBC # FLD AUTO: 5.94 K/UL — SIGNIFICANT CHANGE UP (ref 3.8–10.5)

## 2023-07-30 RX ORDER — ACETAMINOPHEN 500 MG
650 TABLET ORAL EVERY 6 HOURS
Refills: 0 | Status: DISCONTINUED | OUTPATIENT
Start: 2023-07-30 | End: 2023-07-31

## 2023-07-30 RX ADMIN — HEPARIN SODIUM 5000 UNIT(S): 5000 INJECTION INTRAVENOUS; SUBCUTANEOUS at 05:13

## 2023-07-30 RX ADMIN — HEPARIN SODIUM 5000 UNIT(S): 5000 INJECTION INTRAVENOUS; SUBCUTANEOUS at 13:49

## 2023-07-30 RX ADMIN — HEPARIN SODIUM 5000 UNIT(S): 5000 INJECTION INTRAVENOUS; SUBCUTANEOUS at 21:58

## 2023-07-30 NOTE — PROGRESS NOTE ADULT - ASSESSMENT
· Assessment	  72F s/p laparoscopic R colectomy for cancer 7/25. NG placed 7/27 for severe nausea, PO intolerance, immediate return of 600cc gastric fluid.     Plan:  - AC/DVT: heparin   Injectable 5000 Unit(s) SubCutaneous every 8 hours  - Diet: LRD  - Monitor bowel fxn    [facility]  [id number] · Assessment	  72F s/p laparoscopic R colectomy for cancer 7/25. NG placed 7/27 for severe nausea, PO intolerance, immediate return of 600cc gastric fluid.     Plan:  - AC/DVT: heparin   Injectable 5000 Unit(s) SubCutaneous every 8 hours  - Diet: LRD  - Monitor bowel fxn  - Discharge per clinical improvement  [facility]  [id number]

## 2023-07-30 NOTE — PROGRESS NOTE ADULT - SUBJECTIVE AND OBJECTIVE BOX
[facility] Surgery Daily Resident Progress Note    OVERNIGHT: No acute overnight events.    SUBJECTIVE: Pt seen and examined at bedside. Pain well controlled. No nausea or vomiting. Passing gas and having bowel movement.  Patient expressed that she would want to go on bland LRD diet consisting of tea, milk, boiled potatoes     OBJECTIVE:  Vital Signs Last 24 Hrs  T(C): 36.4 (29 Jul 2023 21:21), Max: 37 (29 Jul 2023 17:03)  T(F): 97.5 (29 Jul 2023 21:21), Max: 98.6 (29 Jul 2023 17:03)  HR: 91 (29 Jul 2023 21:21) (77 - 92)  BP: 128/86 (29 Jul 2023 21:21) (107/71 - 128/86)  BP(mean): --  RR: 18 (29 Jul 2023 21:21) (17 - 18)  SpO2: 97% (29 Jul 2023 21:21) (94% - 97%)    Parameters below as of 29 Jul 2023 21:21  Patient On (Oxygen Delivery Method): room air      Physical Exam:  General Appearance: No acute distress, resting comfortably.   Chest: Equal expansion bilaterally, no increased WOB.   CV: WWP.   Abdomen:   Extremities: No gross deformity appreciated.     LABS:                        9.3    7.42  )-----------( 397      ( 29 Jul 2023 07:19 )             30.7     07-29    138  |  100  |  19  ----------------------------<  116<H>  4.1   |  24  |  0.59    Ca    9.1      29 Jul 2023 07:20  Phos  3.4     07-29  Mg     2.4     07-29        Urinalysis Basic - ( 29 Jul 2023 07:20 )    Color: x / Appearance: x / SG: x / pH: x  Gluc: 116 mg/dL / Ketone: x  / Bili: x / Urobili: x   Blood: x / Protein: x / Nitrite: x   Leuk Esterase: x / RBC: x / WBC x   Sq Epi: x / Non Sq Epi: x / Bacteria: x   [facility] Daily Resident Progress Note    OVERNIGHT: No acute overnight events.    SUBJECTIVE: Pt seen and examined at bedside. Pain well controlled. No nausea or vomiting. Passing gas and having bowel movement.  Patient expressed that she would want to go on bland LRD diet consisting of tea, milk, boiled potatoes     OBJECTIVE:  Vital Signs Last 24 Hrs  T(C): 36.4 (29 Jul 2023 21:21), Max: 37 (29 Jul 2023 17:03)  T(F): 97.5 (29 Jul 2023 21:21), Max: 98.6 (29 Jul 2023 17:03)  HR: 91 (29 Jul 2023 21:21) (77 - 92)  BP: 128/86 (29 Jul 2023 21:21) (107/71 - 128/86)  BP(mean): --  RR: 18 (29 Jul 2023 21:21) (17 - 18)  SpO2: 97% (29 Jul 2023 21:21) (94% - 97%)    Parameters below as of 29 Jul 2023 21:21  Patient On (Oxygen Delivery Method): room air      Physical Exam:  General Appearance: No acute distress, resting comfortably.   Chest: Equal expansion bilaterally, no increased WOB.   CV: WWP.   Abdomen: soft, non distended, non tender  Extremities: No gross deformity appreciated.     LABS:                        9.3    7.42  )-----------( 397      ( 29 Jul 2023 07:19 )             30.7     07-29    138  |  100  |  19  ----------------------------<  116<H>  4.1   |  24  |  0.59    Ca    9.1      29 Jul 2023 07:20  Phos  3.4     07-29  Mg     2.4     07-29        Urinalysis Basic - ( 29 Jul 2023 07:20 )    Color: x / Appearance: x / SG: x / pH: x  Gluc: 116 mg/dL / Ketone: x  / Bili: x / Urobili: x   Blood: x / Protein: x / Nitrite: x   Leuk Esterase: x / RBC: x / WBC x   Sq Epi: x / Non Sq Epi: x / Bacteria: x

## 2023-07-30 NOTE — PROGRESS NOTE ADULT - ATTENDING COMMENTS
I saw and examined the patient, and reviewed  the history with the patient and   Agree with note which was also reviewed and edited where appropriate.  D/W patient, RN, residents and Fellow
vomited yest  feels better w ngt  abd benign  cont npo
Patient had nausea and vomiting overnight.  This morning she feels better.  After the nausea and vomiting started she also had multiple bowel movements with flatus.  Abdomen soft and without tenderness.  Abdominal x-ray with gas in the colon.  There was some small bowel distention as well.  Will observe the patient this morning.  N.p.o. except sips and chips.  If further nausea and vomiting then NG tube will be needed.  If more bowel function then can restart clear liquids and advance diet as tolerated.  Patient is aware that I will be on vacation for the next week.
I saw and examined the patient, and reviewed  the history with the patient and   Agree with note which was also reviewed and edited where appropriate.  D/W patient, RN, residents and Fellow
I saw patient this morning at 7:50 AM.  She was doing well, sitting in the chair, tolerating liquids.  Plan is to continue ERP.  Once tolerating diet with bowel function patient can be discharged.

## 2023-07-31 ENCOUNTER — TRANSCRIPTION ENCOUNTER (OUTPATIENT)
Age: 73
End: 2023-07-31

## 2023-07-31 VITALS
TEMPERATURE: 98 F | HEART RATE: 85 BPM | DIASTOLIC BLOOD PRESSURE: 73 MMHG | OXYGEN SATURATION: 97 % | SYSTOLIC BLOOD PRESSURE: 122 MMHG | RESPIRATION RATE: 18 BRPM

## 2023-07-31 LAB
ANION GAP SERPL CALC-SCNC: 14 MMOL/L — SIGNIFICANT CHANGE UP (ref 5–17)
BUN SERPL-MCNC: 12 MG/DL — SIGNIFICANT CHANGE UP (ref 7–23)
CALCIUM SERPL-MCNC: 9 MG/DL — SIGNIFICANT CHANGE UP (ref 8.4–10.5)
CHLORIDE SERPL-SCNC: 103 MMOL/L — SIGNIFICANT CHANGE UP (ref 96–108)
CO2 SERPL-SCNC: 19 MMOL/L — LOW (ref 22–31)
CREAT SERPL-MCNC: 0.57 MG/DL — SIGNIFICANT CHANGE UP (ref 0.5–1.3)
EGFR: 96 ML/MIN/1.73M2 — SIGNIFICANT CHANGE UP
GLUCOSE SERPL-MCNC: 107 MG/DL — HIGH (ref 70–99)
HCT VFR BLD CALC: 29.9 % — LOW (ref 34.5–45)
HGB BLD-MCNC: 9 G/DL — LOW (ref 11.5–15.5)
MAGNESIUM SERPL-MCNC: 2.2 MG/DL — SIGNIFICANT CHANGE UP (ref 1.6–2.6)
MCHC RBC-ENTMCNC: 24.6 PG — LOW (ref 27–34)
MCHC RBC-ENTMCNC: 30.1 GM/DL — LOW (ref 32–36)
MCV RBC AUTO: 81.7 FL — SIGNIFICANT CHANGE UP (ref 80–100)
NRBC # BLD: 0 /100 WBCS — SIGNIFICANT CHANGE UP (ref 0–0)
PHOSPHATE SERPL-MCNC: 3.8 MG/DL — SIGNIFICANT CHANGE UP (ref 2.5–4.5)
PLATELET # BLD AUTO: 414 K/UL — HIGH (ref 150–400)
POTASSIUM SERPL-MCNC: 4 MMOL/L — SIGNIFICANT CHANGE UP (ref 3.5–5.3)
POTASSIUM SERPL-SCNC: 4 MMOL/L — SIGNIFICANT CHANGE UP (ref 3.5–5.3)
RBC # BLD: 3.66 M/UL — LOW (ref 3.8–5.2)
RBC # FLD: 19.6 % — HIGH (ref 10.3–14.5)
SODIUM SERPL-SCNC: 136 MMOL/L — SIGNIFICANT CHANGE UP (ref 135–145)
WBC # BLD: 5.29 K/UL — SIGNIFICANT CHANGE UP (ref 3.8–10.5)
WBC # FLD AUTO: 5.29 K/UL — SIGNIFICANT CHANGE UP (ref 3.8–10.5)

## 2023-07-31 PROCEDURE — C9399: CPT

## 2023-07-31 PROCEDURE — 85027 COMPLETE CBC AUTOMATED: CPT

## 2023-07-31 PROCEDURE — 88302 TISSUE EXAM BY PATHOLOGIST: CPT

## 2023-07-31 PROCEDURE — 97162 PT EVAL MOD COMPLEX 30 MIN: CPT

## 2023-07-31 PROCEDURE — 74018 RADEX ABDOMEN 1 VIEW: CPT

## 2023-07-31 PROCEDURE — C1889: CPT

## 2023-07-31 PROCEDURE — 36415 COLL VENOUS BLD VENIPUNCTURE: CPT

## 2023-07-31 PROCEDURE — 88342 IMHCHEM/IMCYTCHM 1ST ANTB: CPT

## 2023-07-31 PROCEDURE — 88341 IMHCHEM/IMCYTCHM EA ADD ANTB: CPT

## 2023-07-31 PROCEDURE — 88309 TISSUE EXAM BY PATHOLOGIST: CPT

## 2023-07-31 PROCEDURE — 84100 ASSAY OF PHOSPHORUS: CPT

## 2023-07-31 PROCEDURE — 82962 GLUCOSE BLOOD TEST: CPT

## 2023-07-31 PROCEDURE — 80048 BASIC METABOLIC PNL TOTAL CA: CPT

## 2023-07-31 PROCEDURE — 71045 X-RAY EXAM CHEST 1 VIEW: CPT

## 2023-07-31 PROCEDURE — 83735 ASSAY OF MAGNESIUM: CPT

## 2023-07-31 RX ORDER — ACETAMINOPHEN 500 MG
2 TABLET ORAL
Qty: 0 | Refills: 0 | DISCHARGE
Start: 2023-07-31

## 2023-07-31 RX ORDER — APIXABAN 2.5 MG/1
1 TABLET, FILM COATED ORAL
Qty: 56 | Refills: 0
Start: 2023-07-31 | End: 2023-08-27

## 2023-07-31 RX ADMIN — HEPARIN SODIUM 5000 UNIT(S): 5000 INJECTION INTRAVENOUS; SUBCUTANEOUS at 05:53

## 2023-07-31 NOTE — PROGRESS NOTE ADULT - ASSESSMENT
72F s/p laparoscopic R colectomy for cancer 7/25. NG placed 7/27 for severe nausea, PO intolerance, immediate return of 600cc gastric fluid.     Plan:  - AC/DVT: heparin   Injectable 5000 Unit(s) SubCutaneous every 8 hours  - Diet: LRD  - Monitor bowel fxn  - Discharge per clinical improvement  [facility]  [id number]   72F s/p laparoscopic R colectomy for cancer 7/25. NG placed 7/27 for severe nausea, PO intolerance, immediate return of 600cc gastric fluid. Patient passed clamp trial 7/29 and NGT was removed.     Plan:  - AC/DVT: heparin   Injectable 5000 Unit(s) SubCutaneous every 8 hours  - Diet: LRD  - Monitor bowel fxn  - Discharge per clinical improvement  [facility]  [id number]   72F s/p laparoscopic R colectomy for cancer 7/25. NG placed 7/27 for severe nausea, PO intolerance, immediate return of 600cc gastric fluid. Patient passed clamp trial 7/29 and NGT was removed.     Plan:  - AC/DVT: heparin   Injectable 5000 Unit(s) SubCutaneous every 8 hours  - Diet: LRD  - Discharge on DVT ppx    [facility]  [id number]

## 2023-07-31 NOTE — DISCHARGE NOTE NURSING/CASE MANAGEMENT/SOCIAL WORK - PATIENT PORTAL LINK FT
You can access the FollowMyHealth Patient Portal offered by [facility] by registering at the following website: [email or web address]. By joining [employer]’s FollowMyHealth portal, you will also be able to view your health information using other applications (apps) compatible with our system.

## 2023-07-31 NOTE — DISCHARGE NOTE NURSING/CASE MANAGEMENT/SOCIAL WORK - NSDCPEFALRISK_GEN_ALL_CORE
For information on Fall & Injury Prevention, visit: [email or web address] OR  [email or web address] OR  https://www.cdc.gov/steadi/patient.html

## 2023-07-31 NOTE — PROGRESS NOTE ADULT - SUBJECTIVE AND OBJECTIVE BOX
[facility] Daily Resident Progress Note    OVERNIGHT:  No acute events.     SUBJECTIVE: Pt seen and examined at bedside.     OBJECTIVE:  Vital Signs Last 24 Hrs  T(C): 36.9 (31 Jul 2023 01:06), Max: 37.1 (30 Jul 2023 13:24)  T(F): 98.4 (31 Jul 2023 01:06), Max: 98.8 (30 Jul 2023 13:24)  HR: 83 (31 Jul 2023 01:06) (83 - 93)  BP: 110/75 (31 Jul 2023 01:06) (109/72 - 122/77)  BP(mean): --  RR: 18 (31 Jul 2023 01:06) (17 - 18)  SpO2: 98% (31 Jul 2023 01:06) (95% - 100%)    Parameters below as of 31 Jul 2023 01:06  Patient On (Oxygen Delivery Method): room air      Physical Exam:  General Appearance:   Chest:   CV:  Abdomen:   Extremities:         Medications:  MEDICATIONS  (STANDING):  heparin   Injectable 5000 Unit(s) SubCutaneous every 8 hours    MEDICATIONS  (PRN):  acetaminophen     Tablet .. 650 milliGRAM(s) Oral every 6 hours PRN Mild Pain (1 - 3), Moderate Pain (4 - 6)    Allergies:  penicillin (Unknown)      Labs:  07-30    136  |  104  |  15  ----------------------------<  113<H>  4.0   |  19<L>  |  0.58    Ca    9.2      30 Jul 2023 07:46  Phos  3.7     07-30  Mg     2.5     07-30                            9.4    5.94  )-----------( 443      ( 30 Jul 2023 07:47 )             31.4      [facility] Daily Resident Progress Note    OVERNIGHT:  No acute events.     SUBJECTIVE: Pt seen and examined at bedside.     OBJECTIVE:  Vital Signs Last 24 Hrs  T(C): 36.9 (31 Jul 2023 01:06), Max: 37.1 (30 Jul 2023 13:24)  T(F): 98.4 (31 Jul 2023 01:06), Max: 98.8 (30 Jul 2023 13:24)  HR: 83 (31 Jul 2023 01:06) (83 - 93)  BP: 110/75 (31 Jul 2023 01:06) (109/72 - 122/77)  BP(mean): --  RR: 18 (31 Jul 2023 01:06) (17 - 18)  SpO2: 98% (31 Jul 2023 01:06) (95% - 100%)    Parameters below as of 31 Jul 2023 01:06  Patient On (Oxygen Delivery Method): room air      Physical Exam:  CONSTITUTIONAL: no apparent distress  PSYCH: Appropriate insight/judgment; A+O x 3, mood and affect appropriate  HENT: CN II-XII grossly intact, No conjunctival or scleral injection, non-icteric  NECK: Supple, symmetric and without tracheal deviation   RESP: No respiratory distress, no use of accessory muscles  GI: No rebound, no guarding; no palpable masses  SKIN: No rashes or ulcers noted        Medications:  MEDICATIONS  (STANDING):  heparin   Injectable 5000 Unit(s) SubCutaneous every 8 hours    MEDICATIONS  (PRN):  acetaminophen     Tablet .. 650 milliGRAM(s) Oral every 6 hours PRN Mild Pain (1 - 3), Moderate Pain (4 - 6)    Allergies:  penicillin (Unknown)      Labs:  07-30    136  |  104  |  15  ----------------------------<  113<H>  4.0   |  19<L>  |  0.58    Ca    9.2      30 Jul 2023 07:46  Phos  3.7     07-30  Mg     2.5     07-30                            9.4    5.94  )-----------( 443      ( 30 Jul 2023 07:47 )             31.4

## 2023-08-02 PROBLEM — I26.99 OTHER PULMONARY EMBOLISM WITHOUT ACUTE COR PULMONALE: Chronic | Status: ACTIVE | Noted: 2023-07-17

## 2023-08-02 PROBLEM — M19.90 UNSPECIFIED OSTEOARTHRITIS, UNSPECIFIED SITE: Chronic | Status: ACTIVE | Noted: 2023-07-17

## 2023-08-08 LAB — SURGICAL PATHOLOGY STUDY: SIGNIFICANT CHANGE UP

## 2023-08-16 ENCOUNTER — APPOINTMENT (OUTPATIENT)
Dept: SURGERY | Facility: CLINIC | Age: 73
End: 2023-08-16
Payer: MEDICARE

## 2023-08-16 VITALS
DIASTOLIC BLOOD PRESSURE: 70 MMHG | HEART RATE: 82 BPM | BODY MASS INDEX: 30.86 KG/M2 | SYSTOLIC BLOOD PRESSURE: 106 MMHG | HEIGHT: 64.5 IN | OXYGEN SATURATION: 97 % | TEMPERATURE: 97.1 F | WEIGHT: 183 LBS | RESPIRATION RATE: 18 BRPM

## 2023-08-16 PROCEDURE — 99024 POSTOP FOLLOW-UP VISIT: CPT

## 2023-08-16 RX ORDER — NEOMYCIN SULFATE 500 MG/1
500 TABLET ORAL
Qty: 3 | Refills: 0 | Status: DISCONTINUED | COMMUNITY
Start: 2023-07-07 | End: 2023-08-16

## 2023-08-16 RX ORDER — METRONIDAZOLE 250 MG/1
250 TABLET ORAL
Qty: 3 | Refills: 0 | Status: DISCONTINUED | COMMUNITY
Start: 2023-07-07 | End: 2023-08-16

## 2023-08-16 NOTE — HISTORY OF PRESENT ILLNESS
[FreeTextEntry1] : Demi is a 73 y/o female here for a post-op visit, s/p laparoscopic assisted right colectomy. Repair of incarcerated umbilical hernia of 1.2 cm on 7/25/23. Due to ascending colon cancer and incarcerated umbilical hernia of 1.2 cm.  Pathology - 1. Soft tissue, umbilical, hernia contents, herniorrhaphy: benign adipose tissue.   2. Right colon, terminal ileum, appendix, and lymph nodes, right hemicolectomy - Invasive moderately differentiated adenocarcinoma of ascending colon (6.7 cm) - Tumor invading through muscularis propria into pericolonic tissue - Resection margins negative for carcinoma - Lymphovascular invasion is identified - Forty six lymph nodes negative for carcinoma (0/46) - pTNM Stage (AJCC 8th ed.): pT3N0  Today patient denies surgical incisional pain.  BMs formed once daily.   Good appetite.   Denies nausea, vomiting, fever or chills.  Surgical incision sites with steri strip, no redness, swelling or drainage.

## 2023-08-16 NOTE — ASSESSMENT
[FreeTextEntry1] : Patient doing well.  Advance diet as tolerated.  Still has significant fatigue.  We will get routine labs.  Oncology consult given.  Follow-up with me 1 month.

## 2023-08-16 NOTE — PHYSICAL EXAM
[Abdomen Masses] : No abdominal masses [Abdomen Tenderness] : ~T No ~M abdominal tenderness [de-identified] : Normal wound healing

## 2023-08-21 LAB
ALBUMIN SERPL ELPH-MCNC: 4.1 G/DL
ALP BLD-CCNC: 83 U/L
ALT SERPL-CCNC: 9 U/L
ANION GAP SERPL CALC-SCNC: 12 MMOL/L
AST SERPL-CCNC: 16 U/L
BASOPHILS # BLD AUTO: 0.03 K/UL
BASOPHILS NFR BLD AUTO: 0.8 %
BILIRUB SERPL-MCNC: 0.2 MG/DL
BUN SERPL-MCNC: 14 MG/DL
CALCIUM SERPL-MCNC: 9.2 MG/DL
CHLORIDE SERPL-SCNC: 105 MMOL/L
CO2 SERPL-SCNC: 23 MMOL/L
CREAT SERPL-MCNC: 0.63 MG/DL
EGFR: 94 ML/MIN/1.73M2
EOSINOPHIL # BLD AUTO: 0.17 K/UL
EOSINOPHIL NFR BLD AUTO: 4.4 %
GLUCOSE SERPL-MCNC: 106 MG/DL
HCT VFR BLD CALC: 29 %
HGB BLD-MCNC: 8.9 G/DL
IMM GRANULOCYTES NFR BLD AUTO: 0 %
LYMPHOCYTES # BLD AUTO: 1.57 K/UL
LYMPHOCYTES NFR BLD AUTO: 40.3 %
MAN DIFF?: NORMAL
MCHC RBC-ENTMCNC: 24.7 PG
MCHC RBC-ENTMCNC: 30.7 GM/DL
MCV RBC AUTO: 80.3 FL
MONOCYTES # BLD AUTO: 0.36 K/UL
MONOCYTES NFR BLD AUTO: 9.2 %
NEUTROPHILS # BLD AUTO: 1.77 K/UL
NEUTROPHILS NFR BLD AUTO: 45.3 %
PLATELET # BLD AUTO: 354 K/UL
POTASSIUM SERPL-SCNC: 4 MMOL/L
PROT SERPL-MCNC: 6.6 G/DL
RBC # BLD: 3.61 M/UL
RBC # FLD: 17.6 %
SODIUM SERPL-SCNC: 140 MMOL/L
WBC # FLD AUTO: 3.9 K/UL

## 2023-09-01 ENCOUNTER — APPOINTMENT (OUTPATIENT)
Dept: MAMMOGRAPHY | Facility: IMAGING CENTER | Age: 73
End: 2023-09-01

## 2023-09-01 ENCOUNTER — APPOINTMENT (OUTPATIENT)
Dept: ULTRASOUND IMAGING | Facility: IMAGING CENTER | Age: 73
End: 2023-09-01

## 2023-09-05 ENCOUNTER — OUTPATIENT (OUTPATIENT)
Dept: OUTPATIENT SERVICES | Facility: HOSPITAL | Age: 73
LOS: 1 days | Discharge: HOME | End: 2023-09-05

## 2023-09-05 DIAGNOSIS — C18.9 MALIGNANT NEOPLASM OF COLON, UNSPECIFIED: ICD-10-CM

## 2023-09-05 DIAGNOSIS — Z96.653 PRESENCE OF ARTIFICIAL KNEE JOINT, BILATERAL: Chronic | ICD-10-CM

## 2023-09-05 DIAGNOSIS — Z96.643 PRESENCE OF ARTIFICIAL HIP JOINT, BILATERAL: Chronic | ICD-10-CM

## 2023-09-07 ENCOUNTER — APPOINTMENT (OUTPATIENT)
Dept: HEMATOLOGY ONCOLOGY | Facility: CLINIC | Age: 73
End: 2023-09-07
Payer: MEDICARE

## 2023-09-07 ENCOUNTER — LABORATORY RESULT (OUTPATIENT)
Age: 73
End: 2023-09-07

## 2023-09-07 ENCOUNTER — RESULT REVIEW (OUTPATIENT)
Age: 73
End: 2023-09-07

## 2023-09-07 VITALS
HEART RATE: 92 BPM | WEIGHT: 158.29 LBS | HEIGHT: 64.17 IN | BODY MASS INDEX: 27.02 KG/M2 | SYSTOLIC BLOOD PRESSURE: 109 MMHG | OXYGEN SATURATION: 98 % | DIASTOLIC BLOOD PRESSURE: 72 MMHG | TEMPERATURE: 97.7 F | RESPIRATION RATE: 19 BRPM

## 2023-09-07 DIAGNOSIS — Z86.718 PERSONAL HISTORY OF OTHER VENOUS THROMBOSIS AND EMBOLISM: ICD-10-CM

## 2023-09-07 DIAGNOSIS — I26.99 OTHER PULMONARY EMBOLISM W/OUT ACUTE COR PULMONALE: ICD-10-CM

## 2023-09-07 LAB
BASOPHILS # BLD AUTO: 0.04 K/UL — SIGNIFICANT CHANGE UP (ref 0–0.2)
BASOPHILS NFR BLD AUTO: 0.6 % — SIGNIFICANT CHANGE UP (ref 0–2)
EOSINOPHIL # BLD AUTO: 0.14 K/UL — SIGNIFICANT CHANGE UP (ref 0–0.5)
EOSINOPHIL NFR BLD AUTO: 2.1 % — SIGNIFICANT CHANGE UP (ref 0–6)
HCT VFR BLD CALC: 29.7 % — LOW (ref 34.5–45)
HGB BLD-MCNC: 9 G/DL — LOW (ref 11.5–15.5)
IMM GRANULOCYTES NFR BLD AUTO: 0.3 % — SIGNIFICANT CHANGE UP (ref 0–0.9)
LYMPHOCYTES # BLD AUTO: 1.44 K/UL — SIGNIFICANT CHANGE UP (ref 1–3.3)
LYMPHOCYTES # BLD AUTO: 21.9 % — SIGNIFICANT CHANGE UP (ref 13–44)
MCHC RBC-ENTMCNC: 23 PG — LOW (ref 27–34)
MCHC RBC-ENTMCNC: 30.3 G/DL — LOW (ref 32–36)
MCV RBC AUTO: 76 FL — LOW (ref 80–100)
MONOCYTES # BLD AUTO: 0.42 K/UL — SIGNIFICANT CHANGE UP (ref 0–0.9)
MONOCYTES NFR BLD AUTO: 6.4 % — SIGNIFICANT CHANGE UP (ref 2–14)
NEUTROPHILS # BLD AUTO: 4.52 K/UL — SIGNIFICANT CHANGE UP (ref 1.8–7.4)
NEUTROPHILS NFR BLD AUTO: 68.7 % — SIGNIFICANT CHANGE UP (ref 43–77)
NRBC # BLD: 0 /100 WBCS — SIGNIFICANT CHANGE UP (ref 0–0)
PLATELET # BLD AUTO: 395 K/UL — SIGNIFICANT CHANGE UP (ref 150–400)
RBC # BLD: 3.91 M/UL — SIGNIFICANT CHANGE UP (ref 3.8–5.2)
RBC # FLD: 17.1 % — HIGH (ref 10.3–14.5)
WBC # BLD: 6.58 K/UL — SIGNIFICANT CHANGE UP (ref 3.8–10.5)
WBC # FLD AUTO: 6.58 K/UL — SIGNIFICANT CHANGE UP (ref 3.8–10.5)

## 2023-09-07 PROCEDURE — 99205 OFFICE O/P NEW HI 60 MIN: CPT

## 2023-09-07 RX ORDER — APIXABAN 2.5 MG/1
2.5 TABLET, FILM COATED ORAL
Qty: 60 | Refills: 0 | Status: DISCONTINUED | COMMUNITY
Start: 2023-08-01 | End: 2023-09-07

## 2023-09-07 NOTE — HISTORY OF PRESENT ILLNESS
[Disease: _____________________] : Disease: [unfilled] [T: ___] : T[unfilled] [N: ___] : N[unfilled] [M: ___] : M[unfilled] [AJCC Stage: ____] : AJCC Stage: [unfilled] [de-identified] : Mrs. Rosas is a 73 year old female presenting to the office for an initial consultation of colon CA.  Colonoscopy by Dr. Gonzales - 06/20/23 - (Rectal bleeding and anemia) - Proximal AC colon mass c/w carcinoma  She is s/p laparoscopic assisted right colectomy. Repair of incarcerated umbilical hernia of 1.2 cm on 7/25/23 with Dr. Morales  Pathology 1. Soft tissue, umbilical, hernia contents, herniorrhaphy: benign adipose tissue. 2. Right colon, terminal ileum, appendix, and lymph nodes, right hemicolectomy - Invasive moderately differentiated adenocarcinoma of ascending colon (6.7 cm) - Tumor invading through muscularis propria into pericolonic tissue - Resection margins negative for carcinoma - Lymphovascular invasion is identified - Forty six lymph nodes negative for carcinoma (0/46) - pTNM Stage (AJCC 8th ed.): pT3N0 - MMR is proficient  [de-identified] : adenocarcinoma [de-identified] : CEA = 3.8 (7/17/23) [de-identified] : Colorectal Surgeon: Phoenix Morales GI: Marcelo Gonzales PCP: Silas Valverde  cell [phone]

## 2023-09-07 NOTE — ASSESSMENT
[FreeTextEntry1] : Patient is s/p laparoscopic assisted right colectomy and repair of incarcerated umbilical hernia on 7/25/23 with Dr. Morales.   Right colon, terminal ileum, appendix, and lymph nodes, right hemicolectomy - Invasive moderately differentiated adenocarcinoma of ascending colon (6.7 cm) - Tumor invading through muscularis propria into pericolonic tissue - Resection margins negative for carcinoma - Lymphovascular invasion is identified - Forty six lymph nodes negative for carcinoma (0/46) - pTNM Stage (AJCC 8th ed.): pT3N0 - MMR is proficient  Patient has a risk of relapse which is relatively low, but given lymphovascular invasion seen on the pathology report, the use of adjuvant capecitabine should be considered.  We reviewed her risk of relapse at ~ 15-20%, and that Xeloda for 6 months can reduce by ~1/3. We reviewed data from Quasar trial, and modest survival benefit. She is not interested to take chemotherapy due to concerns of toxicity and does not match with her philosophy of natural therapies. We also reviewed Dynamic trial and use of ctDNA to decide treatment vs observation. She wants to read about it and I sent email with several articles for her to review. She might do Signatera in the future.  Labs - iron level, CBC, DPYD, and hypercoag PRICE for example.  Patient has a h/o bilateral PE, and no known hypercoag work-up done in the past. Took Eliquis post-op with probable rash on face related to that.  RTO in a couple weeks to discuss next steps. She declines treatment at present.   Time = 75 min

## 2023-09-08 LAB
ALBUMIN SERPL ELPH-MCNC: 4.5 G/DL
ALP BLD-CCNC: 101 U/L
ALT SERPL-CCNC: 9 U/L
ANION GAP SERPL CALC-SCNC: 14 MMOL/L
AST SERPL-CCNC: 13 U/L
AT III PPP CHRO-ACNC: 100 %
BILIRUB SERPL-MCNC: <0.2 MG/DL
BUN SERPL-MCNC: 20 MG/DL
CALCIUM SERPL-MCNC: 9.3 MG/DL
CHLORIDE SERPL-SCNC: 103 MMOL/L
CO2 SERPL-SCNC: 22 MMOL/L
CREAT SERPL-MCNC: 0.81 MG/DL
EGFR: 77 ML/MIN/1.73M2
FERRITIN SERPL-MCNC: 10 NG/ML
GLUCOSE SERPL-MCNC: 152 MG/DL
INR PPP: 0.9 RATIO
IRON SATN MFR SERPL: 3 %
IRON SERPL-MCNC: 14 UG/DL
POTASSIUM SERPL-SCNC: 4 MMOL/L
PROT C PPP CHRO-ACNC: 98 %
PROT S AG ACT/NOR PPP IA: 66 %
PROT SERPL-MCNC: 7.1 G/DL
PT BLD: 10.2 SEC
SODIUM SERPL-SCNC: 138 MMOL/L
TIBC SERPL-MCNC: 455 UG/DL
UIBC SERPL-MCNC: 441 UG/DL

## 2023-09-12 LAB
B2 GLYCOPROT1 AB SER QL: NEGATIVE
CARDIOLIPIN IGM SER-MCNC: 5.3 MPL
CARDIOLIPIN IGM SER-MCNC: <5 GPL
DNA PLOIDY SPEC FC-IMP: NORMAL
FULL GENE SEQUENCE RESULT: NORMAL
INTERPRETATION PGDFRB: NORMAL
Lab: NEGATIVE
PTR INTERP: NORMAL
REF LAB TEST METHOD: NORMAL
REVIEWED BY: NORMAL
TA REPEAT RESULT: NORMAL
TEST PERFORMANCE INFO SPEC: NORMAL

## 2023-09-18 LAB
DPYD GENOTYPE: NORMAL
DPYD PHENOTYPE: NORMAL
DPYD SPECIMEN: NORMAL

## 2023-09-20 ENCOUNTER — APPOINTMENT (OUTPATIENT)
Dept: SURGERY | Facility: CLINIC | Age: 73
End: 2023-09-20
Payer: MEDICARE

## 2023-09-20 VITALS
DIASTOLIC BLOOD PRESSURE: 68 MMHG | TEMPERATURE: 97.5 F | SYSTOLIC BLOOD PRESSURE: 114 MMHG | HEART RATE: 78 BPM | OXYGEN SATURATION: 99 % | RESPIRATION RATE: 18 BRPM

## 2023-09-20 DIAGNOSIS — Z09 ENCOUNTER FOR FOLLOW-UP EXAMINATION AFTER COMPLETED TREATMENT FOR CONDITIONS OTHER THAN MALIGNANT NEOPLASM: ICD-10-CM

## 2023-09-20 PROCEDURE — 99024 POSTOP FOLLOW-UP VISIT: CPT

## 2023-09-20 RX ORDER — GLUC/MSM/COLGN2/HYAL/ANTIARTH3 375-375-20
TABLET ORAL
Refills: 0 | Status: ACTIVE | COMMUNITY

## 2023-09-28 ENCOUNTER — APPOINTMENT (OUTPATIENT)
Dept: HEMATOLOGY ONCOLOGY | Facility: CLINIC | Age: 73
End: 2023-09-28
Payer: MEDICARE

## 2023-09-28 VITALS
OXYGEN SATURATION: 99 % | HEART RATE: 69 BPM | BODY MASS INDEX: 27.06 KG/M2 | TEMPERATURE: 96.6 F | DIASTOLIC BLOOD PRESSURE: 66 MMHG | RESPIRATION RATE: 18 BRPM | WEIGHT: 158.51 LBS | SYSTOLIC BLOOD PRESSURE: 108 MMHG

## 2023-09-28 DIAGNOSIS — C18.9 MALIGNANT NEOPLASM OF COLON, UNSPECIFIED: ICD-10-CM

## 2023-09-28 PROCEDURE — 99214 OFFICE O/P EST MOD 30 MIN: CPT

## 2024-01-26 ENCOUNTER — OUTPATIENT (OUTPATIENT)
Dept: OUTPATIENT SERVICES | Facility: HOSPITAL | Age: 74
LOS: 1 days | Discharge: HOME | End: 2024-01-26

## 2024-01-26 DIAGNOSIS — Z96.653 PRESENCE OF ARTIFICIAL KNEE JOINT, BILATERAL: Chronic | ICD-10-CM

## 2024-01-26 DIAGNOSIS — Z96.643 PRESENCE OF ARTIFICIAL HIP JOINT, BILATERAL: Chronic | ICD-10-CM

## 2024-01-26 DIAGNOSIS — C18.9 MALIGNANT NEOPLASM OF COLON, UNSPECIFIED: ICD-10-CM

## 2024-02-07 ENCOUNTER — APPOINTMENT (OUTPATIENT)
Dept: HEMATOLOGY ONCOLOGY | Facility: CLINIC | Age: 74
End: 2024-02-07

## 2024-04-26 NOTE — PRE-ANESTHESIA EVALUATION ADULT - NSANTHTOTALSCORECAL_ENT_A_CORE
Post Op Note    Surgery: gastric bypass surgery- pouch reduction, hh and linx at outside hosp  Date: 5/7/18  Initial weight: 275  Last weight: 235  Current weight: 213    Diet:    Having pain with eating mainly thicker foods- like chicken  Liquids have trouble    Exercise:  none    MVI: none    Vitals:    04/26/24 1039   BP: 119/68   Pulse: 64   Resp: 16   Temp: 98.3 °F (36.8 °C)       Body comp:  Fat Percent:  41 %  Fat Mass:  87.4 lb  FFM:  126 lb  TBW: 91.2 lb  TBW %:  42.7 %  BMR: 1759 kcal    PE:  NAD  RRR  Soft/nt/nd      Labs: none    A/P: s/p gastric bypass with pain     Needs EGD and UGI  Pain started 6 months ago and is now worsening     Was taking ibuprofen 800 mg    Worried about ulcer, stricture, or esophageal narrowing    Counseling for patient:    Diet: continue trying foods  Exercise: when able  Vitamins: daily mvi    Co morbidities:     1. Gastroesophageal reflux disease, unspecified whether esophagitis present        2. Marginal ulcer  Ambulatory referral/consult to Gastroenterology          -All above: Evaluated, monitored, and treated with diet and exercise program.           1

## 2024-10-31 NOTE — PRE-ANESTHESIA EVALUATION ADULT - NSANTHTIREDRD_ENT_A_CORE
Spoke to Patient regarding CPAP usage/data for his upcoming appointment. His current download ends in May 5/30/24. Mr. Causey confirmed that May is the last time that he used his device. He would would like to discuss other options.   
No

## (undated) DEVICE — LIGASURE MARYLAND 37CM

## (undated) DEVICE — Device

## (undated) DEVICE — SOL IRR POUR NS 0.9% 500ML

## (undated) DEVICE — SOL IRR POUR H2O 250ML

## (undated) DEVICE — SHEARS HARMONIC ACE 5MM X 36CM CURVED TIP

## (undated) DEVICE — GLV 8 PROTEXIS (WHITE)

## (undated) DEVICE — GLV 7 PROTEXIS (WHITE)

## (undated) DEVICE — SPECIMEN CONTAINER 100ML

## (undated) DEVICE — SUT SOFSILK 2-0 30" V-20

## (undated) DEVICE — GLV 6.5 PROTEXIS (WHITE)

## (undated) DEVICE — SUT SOFSILK 3-0 18" V-20 (POP-OFF)

## (undated) DEVICE — SUT CAPROSYN 4-0 30" P-12 UNDYED

## (undated) DEVICE — SUT SOFSILK 2-0 18" V-20 (POP-OFF)

## (undated) DEVICE — PACK COLON BUNDLE

## (undated) DEVICE — POSITIONER FOAM EGG CRATE ULNAR 2PCS (PINK)

## (undated) DEVICE — SUT POLYSORB 1 60" TIES

## (undated) DEVICE — DRAPE GENERAL ENDOSCOPY

## (undated) DEVICE — GELPOINT ADVANCED

## (undated) DEVICE — OSTOMY KIT 2-PIECE 4" NS (YELLOW)

## (undated) DEVICE — DRSG OPSITE 13.75 X 4"

## (undated) DEVICE — MEDICATION LABELS W MARKER

## (undated) DEVICE — SUT SOFSILK 3-0 30" V-20

## (undated) DEVICE — WARMING BLANKET UPPER ADULT

## (undated) DEVICE — PACK MAJOR ABDOMINAL SUPINE

## (undated) DEVICE — TUBING INSUFFLATION LAP FILTER 10FT

## (undated) DEVICE — VENODYNE/SCD SLEEVE CALF LARGE

## (undated) DEVICE — SHEARS COVIDIEN ENDO SHEAR 5MM X 31CM W UNIPOLAR CAUTERY

## (undated) DEVICE — TROCAR COVIDIEN VERSASTEP PLUS 12MM STANDARD

## (undated) DEVICE — BLADE SCALPEL SAFETYLOCK #10

## (undated) DEVICE — INSUFFLATION NDL COVIDIEN STEP 14G FOR STEP/VERSASTEP

## (undated) DEVICE — GELPORT LAPAROSCOPIC SYSTEM

## (undated) DEVICE — PREP CHLORAPREP HI-LITE ORANGE 26ML

## (undated) DEVICE — LIGASURE IMPACT

## (undated) DEVICE — STAPLER SKIN VISI-STAT 35 WIDE

## (undated) DEVICE — DRSG TEGADERM 6"X8"

## (undated) DEVICE — DRAPE 1/2 SHEET 40X57"

## (undated) DEVICE — TROCAR COVIDIEN BLUNT TIP HASSAN 10MM

## (undated) DEVICE — POSITIONER PINK PAD PIGAZZI SYSTEM

## (undated) DEVICE — SUT MAXON 1 36" GS-24

## (undated) DEVICE — DRAPE TOWEL BLUE 17" X 24"

## (undated) DEVICE — STAPLER COVIDIEN ENDO GIA STANDARD HANDLE

## (undated) DEVICE — DRAIN PENROSE .25" X 18" LATEX

## (undated) DEVICE — GOWN TRIMAX LG

## (undated) DEVICE — DRSG STERISTRIPS 0.5 X 4"

## (undated) DEVICE — FOLEY TRAY 16FR 5CC LTX UMETER CLOSED

## (undated) DEVICE — DRAPE MAYO STAND 30"

## (undated) DEVICE — TROCAR COVIDIEN VERSASTEP PLUS 11MM STANDARD

## (undated) DEVICE — GLV 8.5 PROTEXIS (WHITE)

## (undated) DEVICE — LIGASURE BLUNT TIP 44CM

## (undated) DEVICE — BLADE SCALPEL SAFETYLOCK #15

## (undated) DEVICE — GLV 7.5 PROTEXIS (WHITE)

## (undated) DEVICE — TROCAR ETHICON ENDOPATH XCEL BLADELESS 5MM X 100MM STABILITY

## (undated) DEVICE — TUBING STRYKEFLOW II SUCTION / IRRIGATOR